# Patient Record
Sex: MALE | Race: WHITE | HISPANIC OR LATINO
[De-identification: names, ages, dates, MRNs, and addresses within clinical notes are randomized per-mention and may not be internally consistent; named-entity substitution may affect disease eponyms.]

---

## 2018-12-10 PROBLEM — Z00.00 ENCOUNTER FOR PREVENTIVE HEALTH EXAMINATION: Status: ACTIVE | Noted: 2018-12-10

## 2018-12-17 ENCOUNTER — APPOINTMENT (OUTPATIENT)
Dept: ORTHOPEDIC SURGERY | Facility: CLINIC | Age: 23
End: 2018-12-17
Payer: MEDICAID

## 2018-12-17 VITALS — SYSTOLIC BLOOD PRESSURE: 114 MMHG | DIASTOLIC BLOOD PRESSURE: 75 MMHG | HEART RATE: 97 BPM

## 2018-12-17 VITALS — BODY MASS INDEX: 26.66 KG/M2 | HEIGHT: 69 IN | WEIGHT: 180 LBS

## 2018-12-17 DIAGNOSIS — Z78.9 OTHER SPECIFIED HEALTH STATUS: ICD-10-CM

## 2018-12-17 DIAGNOSIS — Z80.0 FAMILY HISTORY OF MALIGNANT NEOPLASM OF DIGESTIVE ORGANS: ICD-10-CM

## 2018-12-17 DIAGNOSIS — M25.561 PAIN IN RIGHT KNEE: ICD-10-CM

## 2018-12-17 DIAGNOSIS — Z87.09 PERSONAL HISTORY OF OTHER DISEASES OF THE RESPIRATORY SYSTEM: ICD-10-CM

## 2018-12-17 PROCEDURE — 73564 X-RAY EXAM KNEE 4 OR MORE: CPT | Mod: RT

## 2018-12-17 PROCEDURE — 99204 OFFICE O/P NEW MOD 45 MIN: CPT

## 2020-07-29 ENCOUNTER — EMERGENCY (EMERGENCY)
Facility: HOSPITAL | Age: 25
LOS: 1 days | Discharge: HOME | End: 2020-07-29
Attending: EMERGENCY MEDICINE
Payer: COMMERCIAL

## 2020-07-29 VITALS
HEART RATE: 82 BPM | TEMPERATURE: 97 F | HEIGHT: 69 IN | SYSTOLIC BLOOD PRESSURE: 130 MMHG | WEIGHT: 171.96 LBS | RESPIRATION RATE: 19 BRPM | OXYGEN SATURATION: 99 % | DIASTOLIC BLOOD PRESSURE: 82 MMHG

## 2020-07-29 LAB
ALBUMIN SERPL ELPH-MCNC: 4.2 G/DL — SIGNIFICANT CHANGE UP (ref 3.5–5)
ALP SERPL-CCNC: 90 U/L — SIGNIFICANT CHANGE UP (ref 40–120)
ALT FLD-CCNC: 71 U/L DA — HIGH (ref 10–60)
ANION GAP SERPL CALC-SCNC: 10 MMOL/L — SIGNIFICANT CHANGE UP (ref 5–17)
APPEARANCE UR: CLEAR — SIGNIFICANT CHANGE UP
AST SERPL-CCNC: 20 U/L — SIGNIFICANT CHANGE UP (ref 10–40)
BASOPHILS # BLD AUTO: 0.04 K/UL — SIGNIFICANT CHANGE UP (ref 0–0.2)
BASOPHILS NFR BLD AUTO: 0.3 % — SIGNIFICANT CHANGE UP (ref 0–2)
BILIRUB SERPL-MCNC: 0.6 MG/DL — SIGNIFICANT CHANGE UP (ref 0.2–1.2)
BILIRUB UR-MCNC: NEGATIVE — SIGNIFICANT CHANGE UP
BUN SERPL-MCNC: 17 MG/DL — SIGNIFICANT CHANGE UP (ref 7–18)
CALCIUM SERPL-MCNC: 9.1 MG/DL — SIGNIFICANT CHANGE UP (ref 8.4–10.5)
CHLORIDE SERPL-SCNC: 104 MMOL/L — SIGNIFICANT CHANGE UP (ref 96–108)
CO2 SERPL-SCNC: 25 MMOL/L — SIGNIFICANT CHANGE UP (ref 22–31)
COLOR SPEC: YELLOW — SIGNIFICANT CHANGE UP
CREAT SERPL-MCNC: 1.33 MG/DL — HIGH (ref 0.5–1.3)
DIFF PNL FLD: NEGATIVE — SIGNIFICANT CHANGE UP
EOSINOPHIL # BLD AUTO: 0.05 K/UL — SIGNIFICANT CHANGE UP (ref 0–0.5)
EOSINOPHIL NFR BLD AUTO: 0.4 % — SIGNIFICANT CHANGE UP (ref 0–6)
GLUCOSE SERPL-MCNC: 124 MG/DL — HIGH (ref 70–99)
GLUCOSE UR QL: NEGATIVE — SIGNIFICANT CHANGE UP
HCT VFR BLD CALC: 45.8 % — SIGNIFICANT CHANGE UP (ref 39–50)
HGB BLD-MCNC: 16.1 G/DL — SIGNIFICANT CHANGE UP (ref 13–17)
IMM GRANULOCYTES NFR BLD AUTO: 0.4 % — SIGNIFICANT CHANGE UP (ref 0–1.5)
KETONES UR-MCNC: ABNORMAL
LEUKOCYTE ESTERASE UR-ACNC: NEGATIVE — SIGNIFICANT CHANGE UP
LIDOCAIN IGE QN: 84 U/L — SIGNIFICANT CHANGE UP (ref 73–393)
LYMPHOCYTES # BLD AUTO: 22.6 % — SIGNIFICANT CHANGE UP (ref 13–44)
LYMPHOCYTES # BLD AUTO: 3.16 K/UL — SIGNIFICANT CHANGE UP (ref 1–3.3)
MCHC RBC-ENTMCNC: 29.8 PG — SIGNIFICANT CHANGE UP (ref 27–34)
MCHC RBC-ENTMCNC: 35.2 GM/DL — SIGNIFICANT CHANGE UP (ref 32–36)
MCV RBC AUTO: 84.7 FL — SIGNIFICANT CHANGE UP (ref 80–100)
MONOCYTES # BLD AUTO: 0.8 K/UL — SIGNIFICANT CHANGE UP (ref 0–0.9)
MONOCYTES NFR BLD AUTO: 5.7 % — SIGNIFICANT CHANGE UP (ref 2–14)
NEUTROPHILS # BLD AUTO: 9.9 K/UL — HIGH (ref 1.8–7.4)
NEUTROPHILS NFR BLD AUTO: 70.6 % — SIGNIFICANT CHANGE UP (ref 43–77)
NITRITE UR-MCNC: NEGATIVE — SIGNIFICANT CHANGE UP
NRBC # BLD: 0 /100 WBCS — SIGNIFICANT CHANGE UP (ref 0–0)
PH UR: 7 — SIGNIFICANT CHANGE UP (ref 5–8)
PLATELET # BLD AUTO: 229 K/UL — SIGNIFICANT CHANGE UP (ref 150–400)
POTASSIUM SERPL-MCNC: 4.1 MMOL/L — SIGNIFICANT CHANGE UP (ref 3.5–5.3)
POTASSIUM SERPL-SCNC: 4.1 MMOL/L — SIGNIFICANT CHANGE UP (ref 3.5–5.3)
PROT SERPL-MCNC: 8 G/DL — SIGNIFICANT CHANGE UP (ref 6–8.3)
PROT UR-MCNC: NEGATIVE — SIGNIFICANT CHANGE UP
RBC # BLD: 5.41 M/UL — SIGNIFICANT CHANGE UP (ref 4.2–5.8)
RBC # FLD: 11.7 % — SIGNIFICANT CHANGE UP (ref 10.3–14.5)
SODIUM SERPL-SCNC: 139 MMOL/L — SIGNIFICANT CHANGE UP (ref 135–145)
SP GR SPEC: 1.01 — SIGNIFICANT CHANGE UP (ref 1.01–1.02)
UROBILINOGEN FLD QL: NEGATIVE — SIGNIFICANT CHANGE UP
WBC # BLD: 14 K/UL — HIGH (ref 3.8–10.5)
WBC # FLD AUTO: 14 K/UL — HIGH (ref 3.8–10.5)

## 2020-07-29 PROCEDURE — 36415 COLL VENOUS BLD VENIPUNCTURE: CPT

## 2020-07-29 PROCEDURE — 81003 URINALYSIS AUTO W/O SCOPE: CPT

## 2020-07-29 PROCEDURE — 85027 COMPLETE CBC AUTOMATED: CPT

## 2020-07-29 PROCEDURE — 83690 ASSAY OF LIPASE: CPT

## 2020-07-29 PROCEDURE — 99284 EMERGENCY DEPT VISIT MOD MDM: CPT | Mod: 25

## 2020-07-29 PROCEDURE — 74176 CT ABD & PELVIS W/O CONTRAST: CPT

## 2020-07-29 PROCEDURE — 96374 THER/PROPH/DIAG INJ IV PUSH: CPT

## 2020-07-29 PROCEDURE — 74176 CT ABD & PELVIS W/O CONTRAST: CPT | Mod: 26

## 2020-07-29 PROCEDURE — 96375 TX/PRO/DX INJ NEW DRUG ADDON: CPT

## 2020-07-29 PROCEDURE — 80053 COMPREHEN METABOLIC PANEL: CPT

## 2020-07-29 PROCEDURE — 99284 EMERGENCY DEPT VISIT MOD MDM: CPT

## 2020-07-29 RX ORDER — SODIUM CHLORIDE 9 MG/ML
1000 INJECTION INTRAMUSCULAR; INTRAVENOUS; SUBCUTANEOUS ONCE
Refills: 0 | Status: COMPLETED | OUTPATIENT
Start: 2020-07-29 | End: 2020-07-29

## 2020-07-29 RX ORDER — ONDANSETRON 8 MG/1
4 TABLET, FILM COATED ORAL ONCE
Refills: 0 | Status: COMPLETED | OUTPATIENT
Start: 2020-07-29 | End: 2020-07-29

## 2020-07-29 RX ORDER — KETOROLAC TROMETHAMINE 30 MG/ML
30 SYRINGE (ML) INJECTION ONCE
Refills: 0 | Status: DISCONTINUED | OUTPATIENT
Start: 2020-07-29 | End: 2020-07-29

## 2020-07-29 RX ORDER — FAMOTIDINE 10 MG/ML
20 INJECTION INTRAVENOUS ONCE
Refills: 0 | Status: COMPLETED | OUTPATIENT
Start: 2020-07-29 | End: 2020-07-29

## 2020-07-29 RX ADMIN — SODIUM CHLORIDE 1000 MILLILITER(S): 9 INJECTION INTRAMUSCULAR; INTRAVENOUS; SUBCUTANEOUS at 04:07

## 2020-07-29 RX ADMIN — Medication 30 MILLIGRAM(S): at 04:07

## 2020-07-29 RX ADMIN — ONDANSETRON 4 MILLIGRAM(S): 8 TABLET, FILM COATED ORAL at 04:07

## 2020-07-29 RX ADMIN — FAMOTIDINE 20 MILLIGRAM(S): 10 INJECTION INTRAVENOUS at 04:07

## 2020-07-29 NOTE — ED PROVIDER NOTE - CLINICAL SUMMARY MEDICAL DECISION MAKING FREE TEXT BOX
430a- CT reported no gallstones, no obstructive uropathy. Pt feels better. PO challenge in progress. 430a- CT reported no gallstones, no obstructive uropathy. Pt feels better. PO challenge in progress.  6am- Pt is well appearing, no guarding to repeat abdominal palpation, able to eat and drink without vomiting. Pt will f/u with GI-contact provided.   Pt is well appearing, has no new complaints and able to walk with normal gait. Pt is stable for discharge and follow up with medical doctor. Pt educated on care and need for follow up. Discussed anticipatory guidance and return precautions. Questions answered. I had a detailed discussion with the patient and/or guardian regarding the historical points, exam findings, and any diagnostic results supporting the discharge diagnosis.

## 2020-07-29 NOTE — ED PROVIDER NOTE - OBJECTIVE STATEMENT
Chief complaint of right flank and RUQ area tenderness starting at 1am associated with N/V. No fever.

## 2020-07-29 NOTE — ED PROVIDER NOTE - PHYSICAL EXAMINATION
No icterus, no jaundice. Right flank and right upper quadrant area tenderness, no rebound no rigidity.

## 2020-07-29 NOTE — ED ADULT TRIAGE NOTE - CHIEF COMPLAINT QUOTE
"I have abdominal pain started last Wednesday but I got worse around 2AM." Pt states he vomited 2-3 times for the past few days. Current nauseous. Denies fever and diarrhea. Pt is 8/10 bilateral lower quadrant radiating to the back. Denies recent travel or possible exposure to covid. "I have abdominal pain started last Wednesday but it got worse around 2AM." Pt states he vomited 2-3 times for the past few days. Current nauseous. Denies fever and diarrhea. Pt is 8/10 bilateral lower quadrant radiating to the back. Denies recent travel or possible exposure to covid.

## 2020-07-29 NOTE — ED PROVIDER NOTE - PATIENT PORTAL LINK FT
You can access the FollowMyHealth Patient Portal offered by [facility] by registering at the following website: [email or web address]. By joining [facility]’s FollowMyHealth portal, you will also be able to view your health information using other applications (apps) compatible with our system.

## 2020-07-29 NOTE — ED PROVIDER NOTE - NSFOLLOWUPCLINICS_GEN_ALL_ED_FT
[facility]  Gastroenterology  [address] [place] [address]  Phone: [phone]  Fax: [phone]  Follow Up Time:

## 2020-07-29 NOTE — ED ADULT NURSE NOTE - CHIEF COMPLAINT QUOTE
"I have abdominal pain started last Wednesday but it got worse around 2AM." Pt states he vomited 2-3 times for the past few days. Current nauseous. Denies fever and diarrhea. Pt is 8/10 bilateral lower quadrant radiating to the back. Denies recent travel or possible exposure to covid.

## 2020-07-29 NOTE — ED PROVIDER NOTE - NSFOLLOWUPINSTRUCTIONS_ED_ALL_ED_FT
Return to ER immediately if your abdominal pain does not improve, worsens, or persists, if you have fever, vomiting,  blood in stools or you have black stools, unable to eat or drink, have worsening/persistent diarrhea or constipation, any concerns. See your doctor as soon as possible (within 1-2 days).   If you need further assistance for appointments you can contact the [facility] Care Coordinator at [phone]. In addition our outpatient [facility] is located at [address] [place] [address], tele: [phone].

## 2020-07-29 NOTE — ED ADULT NURSE NOTE - OBJECTIVE STATEMENT
pt came in c/o right upper quadrant abdominal pain radiating to the back for 4 days, nausea and vomiting for yesterday and today

## 2020-08-05 ENCOUNTER — APPOINTMENT (OUTPATIENT)
Dept: GASTROENTEROLOGY | Facility: CLINIC | Age: 25
End: 2020-08-05
Payer: MEDICAID

## 2020-08-05 ENCOUNTER — LABORATORY RESULT (OUTPATIENT)
Age: 25
End: 2020-08-05

## 2020-08-05 VITALS
OXYGEN SATURATION: 97 % | TEMPERATURE: 98.1 F | HEIGHT: 69 IN | BODY MASS INDEX: 24.14 KG/M2 | SYSTOLIC BLOOD PRESSURE: 106 MMHG | DIASTOLIC BLOOD PRESSURE: 69 MMHG | WEIGHT: 163 LBS | HEART RATE: 84 BPM

## 2020-08-05 PROCEDURE — 99204 OFFICE O/P NEW MOD 45 MIN: CPT

## 2020-08-05 RX ORDER — DICYCLOMINE HYDROCHLORIDE 10 MG/1
10 CAPSULE ORAL 3 TIMES DAILY
Qty: 90 | Refills: 3 | Status: ACTIVE | COMMUNITY
Start: 2020-08-05 | End: 1900-01-01

## 2020-08-05 RX ORDER — PANTOPRAZOLE 40 MG/1
40 TABLET, DELAYED RELEASE ORAL
Qty: 30 | Refills: 3 | Status: ACTIVE | COMMUNITY
Start: 2020-08-05 | End: 1900-01-01

## 2020-08-05 NOTE — HISTORY OF PRESENT ILLNESS
[Yellow Skin Or Eyes (Jaundice)] : denies jaundice [ER Visit] : was seen in the Emergency Department [Rectal Pain] : denies rectal pain [Heartburn] : heartburn [Vomiting] : vomiting [Nausea] : nausea [Constipation] : constipation [Abdominal Pain] : abdominal pain [Diarrhea] : diarrhea [GERD] : gastroesophageal reflux disease [Abdominal Swelling] : abdominal swelling [Wt Gain ___ Lbs] : no recent weight gain [Wt Loss ___ Lbs] : no recent weight loss [Hiatus Hernia] : no hiatus hernia [Peptic Ulcer Disease] : no peptic ulcer disease [Pancreatitis] : no pancreatitis [Cholelithiasis] : no cholelithiasis [Kidney Stone] : no kidney stone [Inflammatory Bowel Disease] : no inflammatory bowel disease [Irritable Bowel Syndrome] : no irritable bowel syndrome [Diverticulitis] : no diverticulitis [Alcohol Abuse] : no alcohol abuse [Appendectomy] : no appendectomy [Malignancy] : no malignancy [Abdominal Surgery] : no abdominal surgery [de-identified] : The patient is a 24-year-old  male with past medical history significant for asthma who was referred to my office by Dr. Redd for abdominal pain, dyspepsia, gastroesophageal reflux disease and nausea/vomiting. The patient also admits to having alternating diarrhea/constipation, change in bowel habits and change in caliber of stool. I was asked to render an opinion for consultation for the above complaints.   The patient states that he is feeling uncomfortable x several weeks. The patient was recently [place] [facility] [place] for abdominal pain.   The patient had blood work and imaging studies to assess the symptoms.  The blood work performed on 2020 revealed an elevated glucose of 124 mg/dl, an elevated creatinine level of 1.3 mg/dl, an elevated ALT of 71 U/L and elevated WBC count of 14.00.  The urinalysis performed on 2020 was unremarkable.  The CAT scan of the abdomen and pelvis with IV contrast performed on 2020 revealed no hydroureteronephrosis or radiopaque urinary tract stones. No appendicitis no radiopaque gallstones. Bowel caliber change in the midline abdomen with mild upstream small bowel dilatation/fecalization and distal collapse which may be due to peristalsis with incomplete passage of the ingested contents.  Bowel obstruction is not entirely excluded. . The patient was observed with resolution of the symptoms and was discharged to followup in the office.   The patient complains of abdominal pain.  The patient describes the abdominal pain as a crampy, intermittent upper abdominal discomfort that occasionally radiates to the back.  The abdominal pain is worse with meals and improves with passing gas or having a bowel movement.  The abdominal pain is described as being severe in nature.  The abdominal pain occurs at night.  The abdominal pain can occur at any time.   The abdominal pain never has awakened the patient from sleep.  The abdominal pain is slightly relieved with certain medication such as pump inhibitors, H2 blockers and antacids. The abdominal pain is associated with abdominal gas and bloating.  The patient complains of nausea and vomiting.  The patient complains of occasional gastroesophageal reflux disease but denies any dysphagia. The gastroesophageal reflux disease is worse after meals and late at night. The gastroesophageal reflux disease is slightly improved with proton pump inhibitors, H2 blockers and antacids.  The patient denies any atypical chest pain, shortness of breath or palpitations.  The patient admits to occasional episodes of diaphoresis.  The patient complains of alternating diarrhea/constipation.  The patient has 1 bowel movement every other day.  The patient complains of a change in bowel habits.  The patient complains of a change in caliber of stool.   The diarrhea is described as soft in nature.  The patient denies having mucus discharge with the bowel movements.  The patient denies any bright red blood per rectum, melena or hematemesis.  The patient denies any rectal pain or rectal pruritus. The patient denies any weight loss or anorexia.  He complained of chills but denies any fevers.  The patient denies any jaundice or pruritus.  The patient denies any back pain.  The patient denies ever having a prior upper endoscopy and colonoscopy performed by another gastroenterologist.  The patient admits to a family history of GI problems.  The patient’s mother had a history of gallstones and father  of pancreatic cancer. [Cholecystectomy] : no cholecystectomy [de-identified] : (-) smoking, (-) ETOH, (-) IVDA\par

## 2020-08-05 NOTE — ASSESSMENT
[FreeTextEntry1] : Abdominal Pain: The patient complains of abdominal pain. The patient is to avoid nonsteroidal anti-inflammatory drugs and aspirin.  I recommend a low FOD-MAP diet.  I recommend a trial of Dicyclomine 10 mg tablet PO 3 times a day PRN for the abdominal pain.\par Dyspepsia: The patient complains of dyspeptic symptoms.  The patient was advised to abide by an anti-gas diet.  The patient was given a pamphlet for anti-gas.  The patient and I reviewed the anti-gas diet at length. The patient is to start on a trial of Phazyme one tablet 3 times a day p.r.n. abdominal pain and gas.\par GERD: The patient was advised to avoid late-night meals and dietary indiscretions.  The patient was advised to avoid fried and fatty foods.  The patient was advised to abide by an anti-GERD diet. The patient was given a pamphlet for anti-GERD.  The patient and I reviewed the anti-GERD diet at length. I recommend a trial of Pantoprazole 40 mg once a day x 3 months for the symptoms.\par Nausea/Vomiting: The patient complains of nausea/vomiting. If the symptoms of nausea/vomiting persists, the patient may require a trial of Zofran 4 mg twice a day.\par Alternating Diarrhea/Constipation: The patient complains of alternating diarrhea/constipation.  I recommend a low residue diet. The patient is to avoid fiber supplementation. The patient is to consider starting a trial of a probiotic such as Align once a day.   I recommend sending stool studies for C+S, O+P x3, and C. difficile to assess for an infectious etiology of the diarrhea.  The symptoms are worse after meals.  The patient has a history of cholecystectomy.  I recommend a trial of cholestyramine one packet once a day for possible bile induced diarrhea. If the symptoms persist, the patient may require a colonoscopy to assess for colitis versus other causes.  The patient was told of the risks and benefits of the procedure.  The patient was told of the risks of perforation, emergency surgery, bleeding, infections and missed lesions.  The patient agreed and will follow-up to reassess the symptoms.\par Abnormal Imaging Study: The CAT scan of the abdomen and pelvis with IV contrast performed on July 29, 2020 revealed no hydroureteronephrosis or radiopaque urinary tract stones. No appendicitis no radiopaque gallstones. Bowel caliber change in the midline abdomen with mild upstream small bowel dilatation/fecalization and distal collapse which may be due to peristalsis with incomplete passage of the ingested contents.  Bowel obstruction is not entirely excluded. \par The patient was previously evaluated at the [facility] emergency room for abdominal pain, nausea and vomiting.   The patient had blood work and imaging studies to assess the symptoms.  I reviewed the blood work and imaging studies performed in the emergency room.  \par Blood Work: I recommend blood work to assess the liver. I recommend a CBC, SMA 24, amylase, lipase, ESR, TFTs, KASIE, rheumatoid factor, celiac sprue panel, IgA, profile for hepatitis A, B, C. ,AFP, alpha 1 anti-trypsin  antibody, ceruloplasmin level, iron, TIBC, ferritin level, AMA, anti smooth muscle antibody and PT/INR/PTT.  I also recommend obtaining the recent blood work performed by the patient's PMD.\par Imaging Study: I recommend an imaging study to assess the symptoms. I recommend an abdominal ultrasound to assess the liver parenchyma and for biliary colic.\par Elevated Liver Enzymes: The patient has elevated liver enzymes noted on prior blood work. The patient denies any jaundice or pruritus.  The patient denies any alcohol use.  The patient denies taking large doses of nonsteroidal anti-inflammatory drugs or acetaminophen.  I had a long discussion with the patient regarding the elevated liver enzymes and the possible progression of the liver disease to cirrhosis.  The patient was told of the possible increased risk of developing liver failure, cirrhosis, ascites, GI bleeding secondary to varices, hepatic encephalopathy, bleeding tendencies and liver cancer.  The patient was told of the importance of follow-up.  The patient was advised to follow up every 6 months for blood work and imaging studies.  I recommend avoid alcohol and hepato-toxic agents.  I recommend avoiding large doses of nonsteroidal anti-inflammatory drugs or acetaminophen.  I recommend a CBC, SMA 24, amylase, lipase, ESR, TFTs, KASIE, rheumatoid factor, celiac sprue panel, IgA, profile for hepatitis A, B, C. ,AFP, alpha 1 anti-trypsin  antibody, ceruloplasmin level, iron, TIBC, ferritin level, AMA, anti smooth muscle antibody and PT/INR/PTT.  I recommend an abdominal ultrasound, CAT scan of the liver, MRI of the liver to assess the liver parenchyma and for liver lesions.   If the liver enzymes remain elevated, the patient may require a CT guided liver biopsy to assess the liver parenchyma and for possible treatment.  We had a long discussion regarding the risks and benefits of the procedure.  The patient was told of the risks of bleeding, perforation, infections, emergency surgery and missing lesions.  The patient agreed and will follow-up to reassess the symptoms.\par If the symptoms persist, the patient may require an upper endoscopy to assess for peptic ulcer disease versus esophagitis.  The patient was told of the risks and benefits of the procedure.  The patient was told of the risks of perforation, emergency surgery, bleeding, infections and missed lesions.  The patient agreed and will follow-up to reassess the symptoms.\par Follow-up: The patient is to follow-up in the office in 4 weeks to reassess the symptoms. The patient was told to call the office if any further problems. \par \par \par \par \par

## 2020-08-05 NOTE — REVIEW OF SYSTEMS
[Feeling Tired] : feeling tired [Constipation] : constipation [Abdominal Pain] : abdominal pain [Diarrhea] : diarrhea [Heartburn] : heartburn [Negative] : Endocrine

## 2020-08-06 LAB
A1AT SERPL-MCNC: 98 MG/DL
AFP-TM SERPL-MCNC: <1.8 NG/ML
ALBUMIN SERPL ELPH-MCNC: 5.2 G/DL
ALP BLD-CCNC: 84 U/L
ALT SERPL-CCNC: 56 U/L
AMYLASE/CREAT SERPL: 47 U/L
ANION GAP SERPL CALC-SCNC: 17 MMOL/L
APTT BLD: 35.9 SEC
AST SERPL-CCNC: 22 U/L
BASOPHILS # BLD AUTO: 0.03 K/UL
BASOPHILS NFR BLD AUTO: 0.4 %
BILIRUB SERPL-MCNC: 0.6 MG/DL
BUN SERPL-MCNC: 14 MG/DL
CALCIUM SERPL-MCNC: 9.9 MG/DL
CHLORIDE SERPL-SCNC: 105 MMOL/L
CHOLEST SERPL-MCNC: 177 MG/DL
CHOLEST/HDLC SERPL: 4.6 RATIO
CO2 SERPL-SCNC: 19 MMOL/L
CREAT SERPL-MCNC: 1.09 MG/DL
EOSINOPHIL # BLD AUTO: 0.1 K/UL
EOSINOPHIL NFR BLD AUTO: 1.4 %
ERYTHROCYTE [SEDIMENTATION RATE] IN BLOOD BY WESTERGREN METHOD: 7 MM/HR
FERRITIN SERPL-MCNC: 343 NG/ML
GGT SERPL-CCNC: 27 U/L
GLIADIN IGA SER QL: <5 UNITS
GLIADIN IGG SER QL: <5 UNITS
GLIADIN PEPTIDE IGA SER-ACNC: NEGATIVE
GLIADIN PEPTIDE IGG SER-ACNC: NEGATIVE
GLUCOSE SERPL-MCNC: 83 MG/DL
HBV CORE IGG+IGM SER QL: NONREACTIVE
HBV CORE IGM SER QL: NONREACTIVE
HBV SURFACE AB SER QL: REACTIVE
HBV SURFACE AG SER QL: NONREACTIVE
HCT VFR BLD CALC: 48.8 %
HCV AB SER QL: NONREACTIVE
HCV S/CO RATIO: 0.24 S/CO
HDLC SERPL-MCNC: 38 MG/DL
HEMOCCULT STL QL: NEGATIVE
HEPATITIS A IGG ANTIBODY: REACTIVE
HGB BLD-MCNC: 15.7 G/DL
IGA SER QL IEP: 150 MG/DL
IMM GRANULOCYTES NFR BLD AUTO: 0.1 %
INR PPP: 1.1 RATIO
IRON SATN MFR SERPL: 26 %
IRON SERPL-MCNC: 84 UG/DL
LDLC SERPL CALC-MCNC: 93 MG/DL
LPL SERPL-CCNC: 24 U/L
LYMPHOCYTES # BLD AUTO: 2.7 K/UL
LYMPHOCYTES NFR BLD AUTO: 38.4 %
MAN DIFF?: NORMAL
MCHC RBC-ENTMCNC: 29.6 PG
MCHC RBC-ENTMCNC: 32.2 GM/DL
MCV RBC AUTO: 92.1 FL
MITOCHONDRIA AB SER IF-ACNC: NORMAL
MONOCYTES # BLD AUTO: 0.47 K/UL
MONOCYTES NFR BLD AUTO: 6.7 %
NEUTROPHILS # BLD AUTO: 3.72 K/UL
NEUTROPHILS NFR BLD AUTO: 53 %
PLATELET # BLD AUTO: 224 K/UL
POTASSIUM SERPL-SCNC: 4.3 MMOL/L
PROT SERPL-MCNC: 7.4 G/DL
PT BLD: 13 SEC
RBC # BLD: 5.3 M/UL
RBC # FLD: 12.3 %
RHEUMATOID FACT SER QL: <10 IU/ML
SMOOTH MUSCLE AB SER QL IF: NORMAL
SODIUM SERPL-SCNC: 140 MMOL/L
TIBC SERPL-MCNC: 321 UG/DL
TRIGL SERPL-MCNC: 224 MG/DL
TSH SERPL-ACNC: 0.87 UIU/ML
TTG IGA SER IA-ACNC: <1.2 U/ML
TTG IGA SER-ACNC: NEGATIVE
TTG IGG SER IA-ACNC: 1.4 U/ML
TTG IGG SER IA-ACNC: NEGATIVE
UIBC SERPL-MCNC: 236 UG/DL
WBC # FLD AUTO: 7.03 K/UL

## 2020-08-07 LAB
ANA SER IF-ACNC: NEGATIVE
HBV E AB SER QL: NEGATIVE
HBV E AG SER QL: NEGATIVE

## 2020-08-19 ENCOUNTER — APPOINTMENT (OUTPATIENT)
Dept: GASTROENTEROLOGY | Facility: CLINIC | Age: 25
End: 2020-08-19
Payer: COMMERCIAL

## 2020-08-19 VITALS
WEIGHT: 163 LBS | OXYGEN SATURATION: 97 % | HEIGHT: 69 IN | TEMPERATURE: 98 F | DIASTOLIC BLOOD PRESSURE: 66 MMHG | SYSTOLIC BLOOD PRESSURE: 123 MMHG | BODY MASS INDEX: 24.14 KG/M2 | HEART RATE: 76 BPM

## 2020-08-19 DIAGNOSIS — R10.12 RIGHT UPPER QUADRANT PAIN: ICD-10-CM

## 2020-08-19 DIAGNOSIS — R11.2 NAUSEA WITH VOMITING, UNSPECIFIED: ICD-10-CM

## 2020-08-19 DIAGNOSIS — R10.11 RIGHT UPPER QUADRANT PAIN: ICD-10-CM

## 2020-08-19 PROCEDURE — 99214 OFFICE O/P EST MOD 30 MIN: CPT

## 2020-08-19 NOTE — HISTORY OF PRESENT ILLNESS
[None] : had no significant interval events [Diarrhea] : denies diarrhea [Rectal Pain] : denies rectal pain [Constipation] : denies constipation [Yellow Skin Or Eyes (Jaundice)] : denies jaundice [Heartburn] : heartburn [Wt Loss ___ Lbs] : recent [unfilled] ~Upound(s) weight loss [Nausea] : nausea [Abdominal Pain] : abdominal pain [Vomiting] : vomiting [Abdominal Swelling] : abdominal swelling [GERD] : gastroesophageal reflux disease [Cholelithiasis] : cholelithiasis [Wt Gain ___ Lbs] : no recent weight gain [Hiatus Hernia] : no hiatus hernia [Pancreatitis] : no pancreatitis [Peptic Ulcer Disease] : no peptic ulcer disease [Inflammatory Bowel Disease] : no inflammatory bowel disease [Kidney Stone] : no kidney stone [Irritable Bowel Syndrome] : no irritable bowel syndrome [Malignancy] : no malignancy [Alcohol Abuse] : no alcohol abuse [Diverticulitis] : no diverticulitis [Abdominal Surgery] : no abdominal surgery [Cholecystectomy] : no cholecystectomy [Appendectomy] : no appendectomy [de-identified] : The patient states that he is feeling better. The patient denies any jaundice or pruritus.  The patient denies any chronic lower back pain. The patient previously complained of abdominal pain.  The patient describes the abdominal pain as a crampy, constant right upper quadrant discomfort that radiates to the back.  The abdominal pain is unrelated to passing gas or having bowel movements.  The abdominal pain is worse with meals.  The abdominal pain is described as being mild moderate severe in nature.  The abdominal pain occurs at night and in the morning.  The abdominal pain can occur at any time.   The abdominal pain has awakened the patient from sleep.  The abdominal pain is not relieved with medication.  The abdominal pain is associated with abdominal gas and bloating.  .The patient previously complained of nausea and vomiting.  He currently denies any nausea or vomiting.  The patient denies any gastroesophageal reflux disease or dysphagia.  The patient denies any atypical chest pain, shortness of breath or palpitations.  The patient denies any diaphoresis. The patient denies any constipation or diarrhea.  The patient has 1 bowel movement every other day.  The patient denies a change in bowel habits.  The patient denies a change in caliber of stool.  The patient denies having mucus discharge with the bowel movements.  The patient denies any bright red blood per rectum, melena or hematemesis.  The patient denies any rectal pain or rectal pruritus.  The patient complains of weight loss but denies any anorexia.  The patient admits to losing 2 to 3 pounds over the past 1 to 2 months.  He denies any fevers or chills.  The patient had an abdominal ultrasound performed on 2020 to assess the symptoms, elevated liver enzymes and fatty liver to assess for biliary colic. The abdominal ultrasound revealed mild hepatosplenomegaly with diffuse fatty infiltration of the liver.  Also noted was cholelithiasis without evidence for cholecystitis. There was limited visualization of the pancreas due to bowel gas.  The patient had blood tests performed to assess the symptoms. The blood tests performed on 2020 revealed a low CO2 of 19 mmol/L, an elevated albumin of 5.2 g/dl, an elevated ALT of 56 U/L, an elevated PTT of 35.9 seconds, an elevated triglyceride level of 224 mg/dl, a reactive hepatitis A IgG antibody and an a reactive hepatitis B surface antibody. The stool guaiac was negative for occult blood.  The patient was recently evaluated at the [facility] emergency room on 2020 for abdominal pain. The patient had blood work and imaging studies to assess the symptoms. The blood work performed on 2020 revealed an elevated glucose of 124 mg/dl, an elevated creatinine level of 1.3 mg/dl, an elevated ALT of 71 U/L and elevated WBC count of 14.00. The urinalysis performed on 2020 was unremarkable. The CAT scan of the abdomen and pelvis with IV contrast performed on 2020 revealed no hydroureteronephrosis or radiopaque urinary tract stones. No appendicitis no radiopaque gallstones. Bowel caliber change in the midline abdomen with mild upstream small bowel dilatation/fecalization and distal collapse which may be due to peristalsis with incomplete passage of the ingested contents. Bowel obstruction is not entirely excluded. The patient was observed with resolution of the symptoms and was discharged to followup in the officeThe patient admits to a family history of GI problems. The patient’s mother had a history of gallstones and father  of pancreatic cancer.  [de-identified] : (-) smoking, (-) ETOH, (-) IVDA\par

## 2020-08-19 NOTE — ASSESSMENT
[FreeTextEntry1] : Abdominal Pain: The patient complains of abdominal pain. The patient is to avoid nonsteroidal anti-inflammatory drugs and aspirin.  I recommend a low FOD-MAP diet.  I recommend a trial of Dicyclomine 10 mg tablet PO 3 times a day PRN for the abdominal pain.\par Dyspepsia: The patient complains of dyspeptic symptoms.  The patient was advised to abide by an anti-gas diet.  The patient was given a pamphlet for anti-gas.  The patient and I reviewed the anti-gas diet at length. The patient is to start on a trial of Phazyme one tablet 3 times a day p.r.n. abdominal pain and gas.\par Nausea/Vomiting: The patient complains of nausea/vomiting. If the symptoms of nausea/vomiting persists, the patient may require a trial of Zofran 4 mg twice a day.\par GERD: The patient was advised to avoid late-night meals and dietary indiscretions.  The patient was advised to avoid fried and fatty foods.  The patient was advised to abide by an anti-GERD diet. The patient was given a pamphlet for anti-GERD.  The patient and I reviewed the anti-GERD diet at length. I recommend continue on a trial of Pantoprazole 40 mg once a day x 3 months for the symptoms.\par Abnormal Imaging Study: The CAT scan of the abdomen and pelvis with IV contrast performed on July 29, 2020 revealed no hydroureteronephrosis or radiopaque urinary tract stones. No appendicitis no radiopaque gallstones. Bowel caliber change in the midline abdomen with mild upstream small bowel dilatation/fecalization and distal collapse which may be due to peristalsis with incomplete passage of the ingested contents. Bowel obstruction is not entirely excluded. \par The patient was previously evaluated at the [facility] emergency room for abdominal pain, nausea and vomiting. The patient had blood work and imaging studies to assess the symptoms. I reviewed the blood work and imaging studies performed in the emergency room. \par Fatty Liver:  The patient had an imaging study suggestive of fatty infiltration of the liver.  The patient denies any jaundice or pruritus.  The patient denies any alcohol use.  The patient denies taking large doses of nonsteroidal anti-inflammatory drugs or acetaminophen.  The findings are suggestive of fatty liver.  The patient and I had a long discussion regarding the risks of fatty liver and possible progression to cirrhosis.  The patient was told of the possible increased risk of developing liver failure, cirrhosis, ascites, GI bleeding secondary to varices, hepatic encephalopathy, bleeding tendencies and liver cancer.  The patient was told of the importance of follow-up.  The patient was advised to follow up every 6 months for blood work and imaging studies. The patient agreed and will follow up. The patient was advised to lose weight. I recommend a trial of vitamin E supplementation for the fatty liver.  If the liver enzymes remain elevated, the patient may require a trial of Pioglitazone for the fatty liver. I recommend avoid alcohol and hepato-toxic agents.  The patient was also advised to avoid NSAIDs, Acetaminophen and any other hepatotoxic drugs. The patient was also advised not to share needles, razors, scissors, nail clippers, etc.. The patient is to continue close follow-up in our office for blood work and exams.  If the liver enzymes remain elevated, the patient may require a CT guided liver biopsy to assess the liver parenchyma for possible treatment.  We had a long discussion regarding the risks and benefits of the procedure.  The patient was told of the risks of bleeding, perforation, infections, emergency surgery and missing lesions.  The patient agreed and will follow-up to reassess the symptoms.  \par Cholelithiasis: The recent abdominal ultrasound performed revealed evidence of cholelithiasis with thickened gallbladder wall.  I recommend a surgical evaluation for possible biliary colic.  The patient was advised to follow up with Dr. Baltazar Hdez of surgery at [facility] for possible laparoscopic cholecystectomy.  The patient and I had a long discussion regarding the findings of the abdominal ultrasound and the need for surgical evaluation.  The patient and I also discussed the possible complications of gallstone disease that include biliary colic, gallbladder perforation, choledocholithiasis, cholangitis, gallstone ileus, gallstone pancreatitis, et cetera. The patient is aware and agrees to follow-up for the surgical evaluation and possible surgery.  The patient was advised to go to the emergency room if fever, chills and worsening abdominal pain develops.\par Elevated Liver Enzymes: The patient has elevated liver enzymes noted on prior blood work. The patient denies any jaundice or pruritus. The patient denies any alcohol use. The patient denies taking large doses of nonsteroidal anti-inflammatory drugs or acetaminophen. I had a long discussion with the patient regarding the elevated liver enzymes and the possible progression of the liver disease to cirrhosis. The patient was told of the possible increased risk of developing liver failure, cirrhosis, ascites, GI bleeding secondary to varices, hepatic encephalopathy, bleeding tendencies and liver cancer. The patient was told of the importance of follow-up. The patient was advised to follow up every 6 months for blood work and imaging studies. I recommend avoid alcohol and hepato-toxic agents. I recommend avoiding large doses of nonsteroidal anti-inflammatory drugs or acetaminophen.   I recommend repeat liver enzymes to reassess the elevated liver enzymes.  If the liver enzymes remain elevated, consider MRCP to assess for choledocholithiasis.\par If the symptoms persist, the patient may require an upper endoscopy to assess for peptic ulcer disease versus esophagitis. The patient was told of the risks and benefits of the procedure. The patient was told of the risks of perforation, emergency surgery, bleeding, infections and missed lesions. The patient agreed and will follow-up to reassess the symptoms.\par Follow-up: The patient is to follow-up in the office in 4 weeks to reassess the symptoms. The patient was told to call the office if any further problems. \par \par \par

## 2020-08-20 LAB
ALBUMIN SERPL ELPH-MCNC: 5 G/DL
ALP BLD-CCNC: 85 U/L
ALT SERPL-CCNC: 60 U/L
AMYLASE/CREAT SERPL: 52 U/L
ANION GAP SERPL CALC-SCNC: 12 MMOL/L
AST SERPL-CCNC: 24 U/L
BASOPHILS # BLD AUTO: 0.05 K/UL
BASOPHILS NFR BLD AUTO: 0.8 %
BILIRUB SERPL-MCNC: 0.9 MG/DL
BUN SERPL-MCNC: 13 MG/DL
CALCIUM SERPL-MCNC: 10 MG/DL
CHLORIDE SERPL-SCNC: 104 MMOL/L
CO2 SERPL-SCNC: 26 MMOL/L
CREAT SERPL-MCNC: 1.3 MG/DL
EOSINOPHIL # BLD AUTO: 0.08 K/UL
EOSINOPHIL NFR BLD AUTO: 1.3 %
GGT SERPL-CCNC: 23 U/L
GLUCOSE SERPL-MCNC: 78 MG/DL
HCT VFR BLD CALC: 49.4 %
HGB BLD-MCNC: 15.3 G/DL
IMM GRANULOCYTES NFR BLD AUTO: 0.2 %
LPL SERPL-CCNC: 28 U/L
LYMPHOCYTES # BLD AUTO: 2.25 K/UL
LYMPHOCYTES NFR BLD AUTO: 37.5 %
MAN DIFF?: NORMAL
MCHC RBC-ENTMCNC: 29.3 PG
MCHC RBC-ENTMCNC: 31 GM/DL
MCV RBC AUTO: 94.6 FL
MONOCYTES # BLD AUTO: 0.49 K/UL
MONOCYTES NFR BLD AUTO: 8.2 %
NEUTROPHILS # BLD AUTO: 3.12 K/UL
NEUTROPHILS NFR BLD AUTO: 52 %
PLATELET # BLD AUTO: 211 K/UL
POTASSIUM SERPL-SCNC: 4.7 MMOL/L
PROT SERPL-MCNC: 7.1 G/DL
RBC # BLD: 5.22 M/UL
RBC # FLD: 12.4 %
SODIUM SERPL-SCNC: 141 MMOL/L
WBC # FLD AUTO: 6 K/UL

## 2020-08-26 ENCOUNTER — APPOINTMENT (OUTPATIENT)
Dept: GASTROENTEROLOGY | Facility: CLINIC | Age: 25
End: 2020-08-26
Payer: COMMERCIAL

## 2020-08-26 VITALS
OXYGEN SATURATION: 98 % | BODY MASS INDEX: 23.64 KG/M2 | HEART RATE: 75 BPM | WEIGHT: 156 LBS | HEIGHT: 68 IN | TEMPERATURE: 97.5 F | SYSTOLIC BLOOD PRESSURE: 101 MMHG | DIASTOLIC BLOOD PRESSURE: 67 MMHG

## 2020-08-26 DIAGNOSIS — R93.89 ABNORMAL FINDINGS ON DIAGNOSTIC IMAGING OF OTHER SPECIFIED BODY STRUCTURES: ICD-10-CM

## 2020-08-26 DIAGNOSIS — R79.89 OTHER SPECIFIED ABNORMAL FINDINGS OF BLOOD CHEMISTRY: ICD-10-CM

## 2020-08-26 DIAGNOSIS — R10.11 RIGHT UPPER QUADRANT PAIN: ICD-10-CM

## 2020-08-26 PROCEDURE — 99214 OFFICE O/P EST MOD 30 MIN: CPT

## 2020-08-26 NOTE — HISTORY OF PRESENT ILLNESS
[None] : had no significant interval events [Heartburn] : denies heartburn [Vomiting] : resolved vomiting [Nausea] : resolved nausea [Constipation] : denies constipation [Diarrhea] : denies diarrhea [Yellow Skin Or Eyes (Jaundice)] : denies jaundice [Wt Loss ___ Lbs] : recent [unfilled] ~Upound(s) weight loss [Rectal Pain] : denies rectal pain [Abdominal Swelling] : abdominal swelling [Abdominal Pain] : abdominal pain [Cholelithiasis] : cholelithiasis [Wt Gain ___ Lbs] : no recent weight gain [GERD] : no gastroesophageal reflux disease [Hiatus Hernia] : no hiatus hernia [Peptic Ulcer Disease] : no peptic ulcer disease [Pancreatitis] : no pancreatitis [Inflammatory Bowel Disease] : no inflammatory bowel disease [Kidney Stone] : no kidney stone [Irritable Bowel Syndrome] : no irritable bowel syndrome [Diverticulitis] : no diverticulitis [Alcohol Abuse] : no alcohol abuse [Malignancy] : no malignancy [Abdominal Surgery] : no abdominal surgery [Appendectomy] : no appendectomy [Cholecystectomy] : no cholecystectomy [de-identified] : (-) smoking, (-) ETOH, (-) IVDA\par  [de-identified] : The patient states that he is feeling better. The patient denies any jaundice or pruritus.  The patient denies any chronic lower back pain. The patient complains of abdominal pain.  The patient describes the abdominal pain as a crampy, intermittent right upper quadrant discomfort that is nonradiating in nature.  The abdominal pain is unrelated to passing gas or having bowel movements.  The abdominal pain is worse with meals.  The abdominal pain is described as being mild to severe in nature.  The abdominal pain occurs at night.  The abdominal pain can occur at any time.   The abdominal pain has awakened the patient from sleep.  The abdominal pain is not relieved with medication.  The patient had 4 episodes of abdominal pain.  The abdominal pain is associated with abdominal gas and bloating. The patient complained of nausea and vomiting during the episodes.  He currently denies any nausea or vomiting.  The patient denies any gastroesophageal reflux disease or dysphagia.  The patient denies any atypical chest pain, shortness of breath or palpitations.  The patient denies any diaphoresis. The patient denies any constipation or diarrhea.  The patient has 1 bowel movement every 1 to 2 days.  The patient denies a change in bowel habits.  The patient denies a change in caliber of stool.  The patient denies having mucus discharge with the bowel movements.  The patient denies any bright red blood per rectum, melena or hematemesis.  The patient denies any rectal pain or rectal pruritus.  The patient complains of weight loss but denies any anorexia.  The patient admits to losing 6 pounds over the past 3 weeks.  The attributed the weight loss to his change in diet.  He denies any fevers or chills.  The patient had an abdominal ultrasound performed on 2020 to assess the symptoms, elevated liver enzymes and fatty liver to assess for biliary colic. The abdominal ultrasound revealed mild hepatosplenomegaly with diffuse fatty infiltration of the liver. Also noted was cholelithiasis without evidence for cholecystitis. There was limited visualization of the pancreas due to bowel gas. The blood work performed on 2020 revealed an elevated ALT of 60 U/L. The blood tests performed on 2020 revealed a low CO2 of 19 mmol/L, an elevated albumin of 5.2 g/dl, an elevated ALT of 56 U/L, an elevated PTT of 35.9 seconds, an elevated triglyceride level of 224 mg/dl, a reactive hepatitis A IgG antibody and an a reactive hepatitis B surface antibody. The stool guaiac was negative for occult blood. The patient was recently evaluated at the [facility] emergency room on 2020 for abdominal pain. The patient had blood work and imaging studies to assess the symptoms. The blood work performed on 2020 revealed an elevated glucose of 124 mg/dl, an elevated creatinine level of 1.3 mg/dl, an elevated ALT of 71 U/L and elevated WBC count of 14.00. The urinalysis performed on 2020 was unremarkable. The CAT scan of the abdomen and pelvis with IV contrast performed on 2020 revealed no hydroureteronephrosis or radiopaque urinary tract stones. No appendicitis no radiopaque gallstones. Bowel caliber change in the midline abdomen with mild upstream small bowel dilatation/fecalization and distal collapse which may be due to peristalsis with incomplete passage of the ingested contents. Bowel obstruction is not entirely excluded. The patient was observed with resolution of the symptoms and was discharged to followup in the office.  The patient admits to a family history of GI problems. The patient’s mother had a history of gallstones and father  of pancreatic cancer.

## 2020-08-26 NOTE — ASSESSMENT
[FreeTextEntry1] : Abdominal Pain: The patient complains of abdominal pain. The patient is to avoid nonsteroidal anti-inflammatory drugs and aspirin.  I recommend a low FOD-MAP diet.  I recommend a trial of Dicyclomine 10 mg tablet PO 3 times a day PRN for the abdominal pain.\par Dyspepsia: The patient complains of dyspeptic symptoms.  The patient was advised to abide by an anti-gas diet.  The patient was given a pamphlet for anti-gas.  The patient and I reviewed the anti-gas diet at length. The patient is to start on a trial of Phazyme one tablet 3 times a day p.r.n. abdominal pain and gas.\par Abnormal Imaging Study: The CAT scan of the abdomen and pelvis with IV contrast performed on July 29, 2020 revealed no hydroureteronephrosis or radiopaque urinary tract stones. No appendicitis no radiopaque gallstones. Bowel caliber change in the midline abdomen with mild upstream small bowel dilatation/fecalization and distal collapse which may be due to peristalsis with incomplete passage of the ingested contents. Bowel obstruction is not entirely excluded.  The blood work performed on August 19, 2020 revealed an elevated ALT of 60 U/L. I recommend an MRCP of the biliary tree to assess for choledocholithiasis.\par Prior ER Evaluation: The patient was previously evaluated at the [facility] [place] emergency room for abdominal pain, nausea and vomiting. The patient had blood work and imaging studies to assess the symptoms. I reviewed the blood work and imaging studies performed in the emergency room. \par Fatty Liver: The patient had an imaging study suggestive of fatty infiltration of the liver. The patient denies any jaundice or pruritus. The patient denies any alcohol use. The patient denies taking large doses of nonsteroidal anti-inflammatory drugs or acetaminophen. The findings are suggestive of fatty liver. The patient and I had a long discussion regarding the risks of fatty liver and possible progression to cirrhosis. The patient was told of the possible increased risk of developing liver failure, cirrhosis, ascites, GI bleeding secondary to varices, hepatic encephalopathy, bleeding tendencies and liver cancer. The patient was told of the importance of follow-up. The patient was advised to follow up every 6 months for blood work and imaging studies. The patient agreed and will follow up. The patient was advised to lose weight. I recommend a trial of vitamin E supplementation for the fatty liver. If the liver enzymes remain elevated, the patient may require a trial of Pioglitazone for the fatty liver. I recommend avoid alcohol and hepato-toxic agents. The patient was also advised to avoid NSAIDs, Acetaminophen and any other hepatotoxic drugs. The patient was also advised not to share needles, razors, scissors, nail clippers, etc.. The patient is to continue close follow-up in our office for blood work and exams. If the liver enzymes remain elevated, the patient may require a CT guided liver biopsy to assess the liver parenchyma for possible treatment. We had a long discussion regarding the risks and benefits of the procedure. The patient was told of the risks of bleeding, perforation, infections, emergency surgery and missing lesions. The patient agreed and will follow-up to reassess the symptoms. \par Cholelithiasis: The recent abdominal ultrasound performed revealed evidence of cholelithiasis with thickened gallbladder wall. I recommend a surgical evaluation for possible biliary colic. The patient was advised to follow up with Dr. Baltazar Hdez of surgery at [facility] [place] for possible laparoscopic cholecystectomy. The patient and I had a long discussion regarding the findings of the abdominal ultrasound and the need for surgical evaluation. The patient and I also discussed the possible complications of gallstone disease that include biliary colic, gallbladder perforation, choledocholithiasis, cholangitis, gallstone ileus, gallstone pancreatitis, et cetera. The patient is aware and agrees to follow-up for the surgical evaluation and possible surgery. The patient was advised to go to the emergency room if fever, chills and worsening abdominal pain develops.\par Elevated Liver Enzymes: The patient has elevated liver enzymes noted on prior blood work. The patient denies any jaundice or pruritus. The patient denies any alcohol use. The patient denies taking large doses of nonsteroidal anti-inflammatory drugs or acetaminophen. I had a long discussion with the patient regarding the elevated liver enzymes and the possible progression of the liver disease to cirrhosis. The patient was told of the possible increased risk of developing liver failure, cirrhosis, ascites, GI bleeding secondary to varices, hepatic encephalopathy, bleeding tendencies and liver cancer. The patient was told of the importance of follow-up. The patient was advised to follow up every 6 months for blood work and imaging studies. I recommend avoid alcohol and hepato-toxic agents. I recommend avoiding large doses of nonsteroidal anti-inflammatory drugs or acetaminophen. I recommend repeat liver enzymes to reassess the elevated liver enzymes. If the liver enzymes remain elevated, consider MRCP to assess for choledocholithiasis.\par If the symptoms persist, the patient may require an upper endoscopy to assess for peptic ulcer disease versus esophagitis. The patient was told of the risks and benefits of the procedure. The patient was told of the risks of perforation, emergency surgery, bleeding, infections and missed lesions. The patient agreed and will follow-up to reassess the symptoms.\par Follow-up: The patient is to follow-up in the office in 4 weeks to reassess the symptoms. The patient was told to call the office if any further problems. \par

## 2020-08-28 ENCOUNTER — OUTPATIENT (OUTPATIENT)
Dept: OUTPATIENT SERVICES | Facility: HOSPITAL | Age: 25
LOS: 1 days | End: 2020-08-28
Payer: COMMERCIAL

## 2020-08-28 ENCOUNTER — APPOINTMENT (OUTPATIENT)
Dept: MRI IMAGING | Facility: HOSPITAL | Age: 25
End: 2020-08-28
Payer: COMMERCIAL

## 2020-08-28 DIAGNOSIS — K76.0 FATTY (CHANGE OF) LIVER, NOT ELSEWHERE CLASSIFIED: ICD-10-CM

## 2020-08-28 DIAGNOSIS — K80.20 CALCULUS OF GALLBLADDER WITHOUT CHOLECYSTITIS WITHOUT OBSTRUCTION: ICD-10-CM

## 2020-08-28 DIAGNOSIS — R79.89 OTHER SPECIFIED ABNORMAL FINDINGS OF BLOOD CHEMISTRY: ICD-10-CM

## 2020-08-28 PROCEDURE — 74183 MRI ABD W/O CNTR FLWD CNTR: CPT

## 2020-08-28 PROCEDURE — 74183 MRI ABD W/O CNTR FLWD CNTR: CPT | Mod: 26

## 2020-09-14 ENCOUNTER — APPOINTMENT (OUTPATIENT)
Dept: SURGERY | Facility: CLINIC | Age: 25
End: 2020-09-14
Payer: COMMERCIAL

## 2020-09-14 VITALS
TEMPERATURE: 97.2 F | DIASTOLIC BLOOD PRESSURE: 72 MMHG | HEIGHT: 68 IN | BODY MASS INDEX: 23.49 KG/M2 | SYSTOLIC BLOOD PRESSURE: 103 MMHG | HEART RATE: 80 BPM | WEIGHT: 155 LBS

## 2020-09-14 DIAGNOSIS — Z80.0 FAMILY HISTORY OF MALIGNANT NEOPLASM OF DIGESTIVE ORGANS: ICD-10-CM

## 2020-09-14 DIAGNOSIS — J45.909 UNSPECIFIED ASTHMA, UNCOMPLICATED: ICD-10-CM

## 2020-09-14 DIAGNOSIS — Z56.0 UNEMPLOYMENT, UNSPECIFIED: ICD-10-CM

## 2020-09-14 DIAGNOSIS — K80.20 CALCULUS OF GALLBLADDER W/OUT CHOLECYSTITIS W/OUT OBSTRUCTION: ICD-10-CM

## 2020-09-14 PROCEDURE — 99203 OFFICE O/P NEW LOW 30 MIN: CPT

## 2020-09-14 SDOH — ECONOMIC STABILITY - INCOME SECURITY: UNEMPLOYMENT, UNSPECIFIED: Z56.0

## 2020-09-14 NOTE — CONSULT LETTER
[Dear  ___] : Dear  [unfilled], [Consult Letter:] : I had the pleasure of evaluating your patient, [unfilled]. [Please see my note below.] : Please see my note below. [Consult Closing:] : Thank you very much for allowing me to participate in the care of this patient.  If you have any questions, please do not hesitate to contact me. [Sincerely,] : Sincerely, [FreeTextEntry3] : Baltazar Hdez MD, FACS

## 2020-09-14 NOTE — DATA REVIEWED
[FreeTextEntry1] : EXAM:  MR MRCP [facility] IC \par PROCEDURE DATE:  08/28/2020 \par INTERPRETATION:  Abdominal MR without and with IV contrast including MRCP\par \par Indication: Elevated liver enzymes. Cholelithiasis.\par \par Technique: Utilizing a 1.5 Shaina high-field magnet, multiplanar multisequence MR images of the abdomen are acquired without and with IV contrast ( 7.5cc Gadavist administered, 2.5 cc discarded), supplemented by thin and thick slab MRCP images. Postcontrast images are acquired dynamically.\par \par Comparison: No prior abdominal MR is available for comparison..\par \par Findings: Hepatic steatosis.\par \par Small gallstone in the gallbladder. No evidence for thickened gallbladder wall, or pericholecystic fluid. No evidence for choledocholithiasis. The common bile duct is not dilated. The main pancreatic duct maintains normal caliber without dilatation.\par \par The pancreas, spleen, adrenals and right kidney appear unremarkable. Tiny fatty lesion in the left kidney, compatible with an angiomyolipoma.\par \par No ascites, or enlarged lymph node. The visualized bowel structures appear grossly unremarkable.\par \par Impression: Hepatic steatosis.\par \par Cholelithiasis.\par \par No evidence for choledocholithiasis. \par

## 2020-09-14 NOTE — HISTORY OF PRESENT ILLNESS
[de-identified] : Mr. PHU CANALES is a 24 year  old patient who was referred by Dr. Leonid Rizo with the chief complaint of having right upper quadrant and epigastric pain for 1 month.  Pain is non-radiating .   He reports no nausea or vomiting and no history of jaundice, acholia or choluria.   Appetite is good and weight is stable.   He   has  family history of biliary tract disease  in his mother.  he had 3 recent episodes of acute abdominal pain . The pain has gotten better because of diet adjustments.  He had an abdominal MRI on  08/28/2020 which revealed Small gallstone in the gallbladder. CBD is normal\par

## 2020-09-14 NOTE — PHYSICAL EXAM
[Abdominal Masses] : No abdominal masses [Abdomen Tenderness] : ~T ~M No abdominal tenderness [Alert] : alert [Oriented to Place] : oriented to place [Oriented to Person] : oriented to person [Oriented to Time] : oriented to time [de-identified] : He  is alert, well-groomed, and cheerful.\par   [Calm] : calm [de-identified] : anicteric.  Nasal mucosa pink, septum midline. Oral mucosa pink.  Tongue midline, Pharynx without exudates.\par   [de-identified] : Neck supple. Trachea midline. Thyroid isthmus barely palpable, lobes not felt.\par

## 2020-09-14 NOTE — PLAN
[FreeTextEntry1] : Mr. CANALES  was told significance of findings, options, risks and benefits were explained.  Informed consent for laparoscopic/possible open  cholecystectomy 52381 and potential risks, benefits and alternatives (surgical options were discussed including non-surgical options or the option of no surgery) to the planned surgery were discussed in depth.  All surgical options were discussed including non-surgical treatments.  He wishes to proceed with surgery.  We will plan for surgery on at the next available date, pending any required insurance pre-certification or pre-approval. He agrees to obtain any necessary pre-operative evaluations and testing prior to surgery.\par Patient advised to seek immediate medical attention with any acute change in symptoms or with the development of any new or worsening symptoms.  Patient's questions and concerns addressed to patient's satisfaction, and patient verbalized an understanding of the information discussed.\par \par

## 2020-10-05 ENCOUNTER — OUTPATIENT (OUTPATIENT)
Dept: OUTPATIENT SERVICES | Facility: HOSPITAL | Age: 25
LOS: 1 days | End: 2020-10-05
Payer: COMMERCIAL

## 2020-10-05 VITALS
HEIGHT: 68 IN | DIASTOLIC BLOOD PRESSURE: 63 MMHG | TEMPERATURE: 99 F | OXYGEN SATURATION: 99 % | HEART RATE: 80 BPM | RESPIRATION RATE: 16 BRPM | WEIGHT: 145.95 LBS | SYSTOLIC BLOOD PRESSURE: 107 MMHG

## 2020-10-05 DIAGNOSIS — K81.9 CHOLECYSTITIS, UNSPECIFIED: ICD-10-CM

## 2020-10-05 DIAGNOSIS — J45.909 UNSPECIFIED ASTHMA, UNCOMPLICATED: ICD-10-CM

## 2020-10-05 DIAGNOSIS — Z01.818 ENCOUNTER FOR OTHER PREPROCEDURAL EXAMINATION: ICD-10-CM

## 2020-10-05 DIAGNOSIS — Q66.01 CONGENITAL TALIPES EQUINOVARUS, RIGHT FOOT: Chronic | ICD-10-CM

## 2020-10-05 DIAGNOSIS — Q66.89 OTHER SPECIFIED CONGENITAL DEFORMITIES OF FEET: Chronic | ICD-10-CM

## 2020-10-05 LAB — BLD GP AB SCN SERPL QL: SIGNIFICANT CHANGE UP

## 2020-10-05 PROCEDURE — G0463: CPT

## 2020-10-05 NOTE — H&P PST ADULT - REASON FOR ADMISSION
I had pain in my right upper abdomen, radiating to my right flank, nausea, vomiting at the end July. I came to this hospital.  An MRI CAT scan were done

## 2020-10-05 NOTE — H&P PST ADULT - NEGATIVE ALLERGY TYPES
no reactions to animals/no reactions to food/no reactions to insect bites/no outdoor environmental allergies/no indoor environmental allergies/no reactions to medicines

## 2020-10-05 NOTE — H&P PST ADULT - ASSESSMENT
24 yo male is scheduled for : Laparoscopic Cholecystectomy with Intra-Operative Cholangiogram  Possible Open, on 10/12/20

## 2020-10-05 NOTE — H&P PST ADULT - HISTORY OF PRESENT ILLNESS
26 yo male with history of Asthma (no hospitalization, no intubation), reports the above. Patient states had pain x 3 in July, and realized that the pain was associated with the food that he has been eating. He has changed his diet and feels better now.   He is scheduled for : Laparoscopic Cholecystectomy with Intra-Operative Cholangiogram     Possible Open, on 10/12/20

## 2020-10-05 NOTE — H&P PST ADULT - NSICDXPROBLEM_GEN_ALL_CORE_FT
PROBLEM DIAGNOSES  Problem: Cholecystitis, unspecified  Assessment and Plan: Laparoscopic Cholecystectomy with Intra-Operative Cholangiogram  Possible Open      Problem: Asthma  Assessment and Plan: Continue using the inhalers as prescribed

## 2020-10-05 NOTE — H&P PST ADULT - NSANTHOSAYNRD_GEN_A_CORE
No. FORD screening performed.  STOP BANG Legend: 0-2 = LOW Risk; 3-4 = INTERMEDIATE Risk; 5-8 = HIGH Risk

## 2020-10-06 PROBLEM — J45.909 UNSPECIFIED ASTHMA, UNCOMPLICATED: Chronic | Status: ACTIVE | Noted: 2020-10-05

## 2020-10-07 DIAGNOSIS — Z01.818 ENCOUNTER FOR OTHER PREPROCEDURAL EXAMINATION: ICD-10-CM

## 2020-10-09 ENCOUNTER — APPOINTMENT (OUTPATIENT)
Dept: DISASTER EMERGENCY | Facility: CLINIC | Age: 25
End: 2020-10-09

## 2020-10-10 LAB — SARS-COV-2 N GENE NPH QL NAA+PROBE: NOT DETECTED

## 2020-10-11 ENCOUNTER — TRANSCRIPTION ENCOUNTER (OUTPATIENT)
Age: 25
End: 2020-10-11

## 2020-10-12 ENCOUNTER — INPATIENT (INPATIENT)
Facility: HOSPITAL | Age: 25
LOS: 1 days | Discharge: HOME | DRG: 419 | End: 2020-10-14
Attending: SPECIALIST | Admitting: SPECIALIST
Payer: COMMERCIAL

## 2020-10-12 ENCOUNTER — APPOINTMENT (OUTPATIENT)
Dept: SURGERY | Facility: HOSPITAL | Age: 25
End: 2020-10-12

## 2020-10-12 ENCOUNTER — RESULT REVIEW (OUTPATIENT)
Age: 25
End: 2020-10-12

## 2020-10-12 VITALS
DIASTOLIC BLOOD PRESSURE: 62 MMHG | HEIGHT: 68 IN | RESPIRATION RATE: 16 BRPM | TEMPERATURE: 99 F | HEART RATE: 91 BPM | SYSTOLIC BLOOD PRESSURE: 101 MMHG | WEIGHT: 145.95 LBS | OXYGEN SATURATION: 100 %

## 2020-10-12 DIAGNOSIS — Q66.01 CONGENITAL TALIPES EQUINOVARUS, RIGHT FOOT: Chronic | ICD-10-CM

## 2020-10-12 DIAGNOSIS — K81.9 CHOLECYSTITIS, UNSPECIFIED: ICD-10-CM

## 2020-10-12 DIAGNOSIS — K80.50 CALCULUS OF BILE DUCT WITHOUT CHOLANGITIS OR CHOLECYSTITIS WITHOUT OBSTRUCTION: ICD-10-CM

## 2020-10-12 LAB — BLD GP AB SCN SERPL QL: SIGNIFICANT CHANGE UP

## 2020-10-12 PROCEDURE — 99221 1ST HOSP IP/OBS SF/LOW 40: CPT

## 2020-10-12 PROCEDURE — 47563 LAPARO CHOLECYSTECTOMY/GRAPH: CPT

## 2020-10-12 PROCEDURE — 88304 TISSUE EXAM BY PATHOLOGIST: CPT | Mod: 26

## 2020-10-12 PROCEDURE — 47563 LAPARO CHOLECYSTECTOMY/GRAPH: CPT | Mod: AS

## 2020-10-12 RX ORDER — SODIUM CHLORIDE 9 MG/ML
3 INJECTION INTRAMUSCULAR; INTRAVENOUS; SUBCUTANEOUS EVERY 8 HOURS
Refills: 0 | Status: DISCONTINUED | OUTPATIENT
Start: 2020-10-12 | End: 2020-10-12

## 2020-10-12 RX ORDER — GABAPENTIN 400 MG/1
1 CAPSULE ORAL
Qty: 6 | Refills: 0
Start: 2020-10-12 | End: 2020-10-13

## 2020-10-12 RX ORDER — BUDESONIDE AND FORMOTEROL FUMARATE DIHYDRATE 160; 4.5 UG/1; UG/1
2 AEROSOL RESPIRATORY (INHALATION)
Qty: 0 | Refills: 0 | DISCHARGE

## 2020-10-12 RX ORDER — ALBUTEROL 90 UG/1
2 AEROSOL, METERED ORAL
Qty: 0 | Refills: 0 | DISCHARGE

## 2020-10-12 RX ORDER — DEXTROSE MONOHYDRATE, SODIUM CHLORIDE, AND POTASSIUM CHLORIDE 50; .745; 4.5 G/1000ML; G/1000ML; G/1000ML
1000 INJECTION, SOLUTION INTRAVENOUS
Refills: 0 | Status: DISCONTINUED | OUTPATIENT
Start: 2020-10-12 | End: 2020-10-14

## 2020-10-12 RX ORDER — HYDROMORPHONE HYDROCHLORIDE 2 MG/ML
1 INJECTION INTRAMUSCULAR; INTRAVENOUS; SUBCUTANEOUS
Refills: 0 | Status: DISCONTINUED | OUTPATIENT
Start: 2020-10-12 | End: 2020-10-12

## 2020-10-12 RX ORDER — BUDESONIDE AND FORMOTEROL FUMARATE DIHYDRATE 160; 4.5 UG/1; UG/1
2 AEROSOL RESPIRATORY (INHALATION)
Refills: 0 | Status: DISCONTINUED | OUTPATIENT
Start: 2020-10-12 | End: 2020-10-14

## 2020-10-12 RX ORDER — ACETAMINOPHEN WITH CODEINE 300MG-30MG
1 TABLET ORAL EVERY 4 HOURS
Refills: 0 | Status: DISCONTINUED | OUTPATIENT
Start: 2020-10-12 | End: 2020-10-14

## 2020-10-12 RX ORDER — HYDROMORPHONE HYDROCHLORIDE 2 MG/ML
0.5 INJECTION INTRAMUSCULAR; INTRAVENOUS; SUBCUTANEOUS
Refills: 0 | Status: DISCONTINUED | OUTPATIENT
Start: 2020-10-12 | End: 2020-10-12

## 2020-10-12 RX ADMIN — HYDROMORPHONE HYDROCHLORIDE 0.5 MILLIGRAM(S): 2 INJECTION INTRAMUSCULAR; INTRAVENOUS; SUBCUTANEOUS at 09:42

## 2020-10-12 RX ADMIN — Medication 1 TABLET(S): at 15:00

## 2020-10-12 RX ADMIN — SODIUM CHLORIDE 3 MILLILITER(S): 9 INJECTION INTRAMUSCULAR; INTRAVENOUS; SUBCUTANEOUS at 07:07

## 2020-10-12 RX ADMIN — Medication 1 TABLET(S): at 21:08

## 2020-10-12 RX ADMIN — HYDROMORPHONE HYDROCHLORIDE 0.5 MILLIGRAM(S): 2 INJECTION INTRAMUSCULAR; INTRAVENOUS; SUBCUTANEOUS at 09:32

## 2020-10-12 RX ADMIN — DEXTROSE MONOHYDRATE, SODIUM CHLORIDE, AND POTASSIUM CHLORIDE 75 MILLILITER(S): 50; .745; 4.5 INJECTION, SOLUTION INTRAVENOUS at 14:21

## 2020-10-12 RX ADMIN — HYDROMORPHONE HYDROCHLORIDE 0.5 MILLIGRAM(S): 2 INJECTION INTRAMUSCULAR; INTRAVENOUS; SUBCUTANEOUS at 09:55

## 2020-10-12 RX ADMIN — BUDESONIDE AND FORMOTEROL FUMARATE DIHYDRATE 2 PUFF(S): 160; 4.5 AEROSOL RESPIRATORY (INHALATION) at 22:15

## 2020-10-12 RX ADMIN — Medication 1 TABLET(S): at 22:00

## 2020-10-12 RX ADMIN — Medication 1 TABLET(S): at 14:21

## 2020-10-12 RX ADMIN — HYDROMORPHONE HYDROCHLORIDE 0.5 MILLIGRAM(S): 2 INJECTION INTRAMUSCULAR; INTRAVENOUS; SUBCUTANEOUS at 11:01

## 2020-10-12 NOTE — CONSULT NOTE ADULT - ASSESSMENT
25 M with PMHx of Asthma (no hx of hospitalization or intubation) p/w pain in right upper abdomen radiating to back associated with nausea and vomiting. S/P Lap Dottie today with IOC showing possible stone in duct.

## 2020-10-12 NOTE — BRIEF OPERATIVE NOTE - NSICDXBRIEFPOSTOP_GEN_ALL_CORE_FT
POST-OP DIAGNOSIS:  Choledocholithiasis 12-Oct-2020 08:55:43  Irvin Adame  Cholecystitis 12-Oct-2020 08:55:34  Irvin Adame

## 2020-10-12 NOTE — PROGRESS NOTE ADULT - SUBJECTIVE AND OBJECTIVE BOX
Pt s- complaints.   ICU Vital Signs Last 24 Hrs  T(C): 36.8 (12 Oct 2020 12:45), Max: 37.1 (12 Oct 2020 09:02)  T(F): 98.3 (12 Oct 2020 12:45), Max: 98.8 (12 Oct 2020 09:02)  HR: 81 (12 Oct 2020 12:45) (69 - 94)  BP: 110/71 (12 Oct 2020 12:45) (101/62 - 136/78)  BP(mean): 75 (12 Oct 2020 10:25) (72 - 90)  ABP: --  ABP(mean): --  RR: 16 (12 Oct 2020 12:45) (12 - 18)  SpO2: 100% (12 Oct 2020 12:45) (95% - 100%)  Alert nad  Abd: soft nt nd  wounds: cdi

## 2020-10-12 NOTE — PACU DISCHARGE NOTE - AIRWAY PATENCY:
Telephone Encounter by Julia Morgan NCMA at 11/05/18 12:07 PM     Author:  Julia Morgan NCMA Service:  (none) Author Type:  Medical Assistant     Filed:  11/05/18 12:08 PM Encounter Date:  11/5/2018 Status:  Signed     :  Julia Morgan NCMA (Medical Assistant)            To RN to triage and discuss pain[MG1.1M]       Revision History        User Key Date/Time User Provider Type Action    > MG1.1 11/05/18 12:08 PM Julia Morgan NCMA Medical Assistant Sign    M - Manual             Satisfactory

## 2020-10-12 NOTE — ASU DISCHARGE PLAN (ADULT/PEDIATRIC) - CARE PROVIDER_API CALL
Baltazar Hdez  SURGERY  [address] [place] [address]  Phone: [phone]  Fax: [phone]  Follow Up Time:     Manjit Dove)  Gastroenterology  [address] [place] [address]  Phone: [phone]  Fax: [phone]  Follow Up Time:

## 2020-10-12 NOTE — CONSULT NOTE ADULT - SUBJECTIVE AND OBJECTIVE BOX
[facility] GI CONSULTATION    Patient is a 25y old  Male who presents with a chief complaint of abdominal pain      PMH/PSH:  PAST MEDICAL & SURGICAL HISTORY:  Asthma    Bilateral club feet      FH:  FAMILY HISTORY:      MEDS:  MEDICATIONS  (STANDING):  budesonide  80 MICROgram(s)/formoterol 4.5 MICROgram(s) Inhaler 2 Puff(s) Inhalation two times a day  sodium chloride 0.9% with potassium chloride 20 mEq/L 1000 milliLiter(s) (75 mL/Hr) IV Continuous <Continuous>    MEDICATIONS  (PRN):  acetaminophen 300 mG/codeine 30 mG 1 Tablet(s) Oral every 4 hours PRN Moderate Pain (4 - 6)    Allergies    No Known Allergies    Intolerances            CONSTITUTIONAL:  No weight loss, fever, chills, weakness or fatigue.  HEENT:  Eyes:  No visual loss, blurred vision, double vision or yellow sclerae. Ears, Nose, Throat:  No hearing loss, sneezing, congestion, runny nose or sore throat.  SKIN:  No rash or itching.  CARDIOVASCULAR:  No chest pain, chest pressure or chest discomfort. No palpitations or edema.  RESPIRATORY:  No shortness of breath, cough or sputum.  GASTROINTESTINAL:  SEE HPI  GENITOURINARY:  No dysuria, hematuria, urinary frequency  NEUROLOGICAL:  No headache, dizziness, syncope, paralysis, ataxia, numbness or tingling in the extremities. No change in bowel or bladder control.  MUSCULOSKELETAL:  No muscle, back pain, joint pain or stiffness.  HEMATOLOGIC:  No anemia, bleeding or bruising.  LYMPHATICS:  No enlarged nodes. No history of splenectomy.  PSYCHIATRIC:  No history of depression or anxiety.  ENDOCRINOLOGIC:  No reports of sweating, cold or heat intolerance. No polyuria or polydipsia.      ______________________________________________________________________  PHYSICAL EXAM:  T(C): 36.7 (10-12-20 @ 10:50), Max: 37.1 (10-12-20 @ 09:02)  HR: 84 (10-12-20 @ 10:50)  BP: 120/68 (10-12-20 @ 10:50)  RR: 15 (10-12-20 @ 10:50)  SpO2: 96% (10-12-20 @ 10:50)  Wt(kg): --    10-12  -  10-12  --------------------------------------------------------  IN:    IV PiggyBack: 750 mL  Total IN: 750 mL    OUT:  Total OUT: 0 mL    Total NET: 750 mL          GEN: NAD, normocephalic  CVS: S1S2+  CHEST: clear to auscultation  ABD: soft , nontender, nondistended, bowel sounds present  EXTR: no cyanosis, no clubbing, no edema  NEURO: Awake and alert; oriented .....  SKIN:  warm;  non icteric    ______________________________________________________________________          [facility] GI CONSULTATION    Patient is a 25y old  Male who presents with a chief complaint of abdominal pain      PMH/PSH:  PAST MEDICAL & SURGICAL HISTORY:  Asthma    Bilateral club feet      FH:  FAMILY HISTORY:      MEDS:  MEDICATIONS  (STANDING):  budesonide  80 MICROgram(s)/formoterol 4.5 MICROgram(s) Inhaler 2 Puff(s) Inhalation two times a day  sodium chloride 0.9% with potassium chloride 20 mEq/L 1000 milliLiter(s) (75 mL/Hr) IV Continuous <Continuous>    MEDICATIONS  (PRN):  acetaminophen 300 mG/codeine 30 mG 1 Tablet(s) Oral every 4 hours PRN Moderate Pain (4 - 6)    Allergies    No Known Allergies    Intolerances            CONSTITUTIONAL:  No weight loss, fever, chills, weakness or fatigue.  HEENT:  Eyes:  No visual loss, blurred vision, double vision or yellow sclerae. Ears, Nose, Throat:  No hearing loss, sneezing, congestion, runny nose or sore throat.  SKIN:  No rash or itching.  CARDIOVASCULAR:  No chest pain, chest pressure or chest discomfort. No palpitations or edema.  RESPIRATORY:  No shortness of breath, cough or sputum.  GASTROINTESTINAL:  SEE HPI  GENITOURINARY:  No dysuria, hematuria, urinary frequency  NEUROLOGICAL:  No headache, dizziness, syncope, paralysis, ataxia, numbness or tingling in the extremities. No change in bowel or bladder control.  MUSCULOSKELETAL:  No muscle, back pain, joint pain or stiffness.  HEMATOLOGIC:  No anemia, bleeding or bruising.  LYMPHATICS:  No enlarged nodes. No history of splenectomy.  PSYCHIATRIC:  No history of depression or anxiety.  ENDOCRINOLOGIC:  No reports of sweating, cold or heat intolerance. No polyuria or polydipsia.      ______________________________________________________________________  PHYSICAL EXAM:  T(C): 36.7 (10-12-20 @ 10:50), Max: 37.1 (10-12-20 @ 09:02)  HR: 84 (10-12-20 @ 10:50)  BP: 120/68 (10-12-20 @ 10:50)  RR: 15 (10-12-20 @ 10:50)  SpO2: 96% (10-12-20 @ 10:50)  Wt(kg): --    10-12  -  10-12  --------------------------------------------------------  IN:    IV PiggyBack: 750 mL  Total IN: 750 mL    OUT:  Total OUT: 0 mL    Total NET: 750 mL          GEN: NAD, normocephalic  CVS: S1S2+  CHEST: clear to auscultation  ABD: soft , nontender, nondistended, bowel sounds present  EXTR: no cyanosis, no clubbing, no edema  NEURO: Awake and alert; oriented to person, place, time and situation   SKIN:  warm;  non icteric    ______________________________________________________________________

## 2020-10-12 NOTE — CONSULT NOTE ADULT - PROBLEM SELECTOR RECOMMENDATION 9
s/p lap ileana with IOC  plan for ERCP likely tomorrow s/p lap ileana with IOC  plan for ERCP tomorrow  NPO after MN s/p lap ileana with IOC  plan for ERCP tomorrow  clear liquid diet   NPO from 8a tomorrow

## 2020-10-13 ENCOUNTER — TRANSCRIPTION ENCOUNTER (OUTPATIENT)
Age: 25
End: 2020-10-13

## 2020-10-13 LAB
ALBUMIN SERPL ELPH-MCNC: 3.5 G/DL — SIGNIFICANT CHANGE UP (ref 3.5–5)
ALP SERPL-CCNC: 79 U/L — SIGNIFICANT CHANGE UP (ref 40–120)
ALT FLD-CCNC: 46 U/L DA — SIGNIFICANT CHANGE UP (ref 10–60)
ANION GAP SERPL CALC-SCNC: 5 MMOL/L — SIGNIFICANT CHANGE UP (ref 5–17)
AST SERPL-CCNC: 17 U/L — SIGNIFICANT CHANGE UP (ref 10–40)
BILIRUB SERPL-MCNC: 0.9 MG/DL — SIGNIFICANT CHANGE UP (ref 0.2–1.2)
BUN SERPL-MCNC: 8 MG/DL — SIGNIFICANT CHANGE UP (ref 7–18)
CALCIUM SERPL-MCNC: 9.2 MG/DL — SIGNIFICANT CHANGE UP (ref 8.4–10.5)
CHLORIDE SERPL-SCNC: 110 MMOL/L — HIGH (ref 96–108)
CO2 SERPL-SCNC: 25 MMOL/L — SIGNIFICANT CHANGE UP (ref 22–31)
CREAT SERPL-MCNC: 1.01 MG/DL — SIGNIFICANT CHANGE UP (ref 0.5–1.3)
GLUCOSE SERPL-MCNC: 89 MG/DL — SIGNIFICANT CHANGE UP (ref 70–99)
HCT VFR BLD CALC: 40.8 % — SIGNIFICANT CHANGE UP (ref 39–50)
HGB BLD-MCNC: 13.9 G/DL — SIGNIFICANT CHANGE UP (ref 13–17)
MCHC RBC-ENTMCNC: 29.3 PG — SIGNIFICANT CHANGE UP (ref 27–34)
MCHC RBC-ENTMCNC: 34.1 GM/DL — SIGNIFICANT CHANGE UP (ref 32–36)
MCV RBC AUTO: 86.1 FL — SIGNIFICANT CHANGE UP (ref 80–100)
NRBC # BLD: 0 /100 WBCS — SIGNIFICANT CHANGE UP (ref 0–0)
PLATELET # BLD AUTO: 187 K/UL — SIGNIFICANT CHANGE UP (ref 150–400)
POTASSIUM SERPL-MCNC: 4.3 MMOL/L — SIGNIFICANT CHANGE UP (ref 3.5–5.3)
POTASSIUM SERPL-SCNC: 4.3 MMOL/L — SIGNIFICANT CHANGE UP (ref 3.5–5.3)
PROT SERPL-MCNC: 6.5 G/DL — SIGNIFICANT CHANGE UP (ref 6–8.3)
RBC # BLD: 4.74 M/UL — SIGNIFICANT CHANGE UP (ref 4.2–5.8)
RBC # FLD: 11.4 % — SIGNIFICANT CHANGE UP (ref 10.3–14.5)
SODIUM SERPL-SCNC: 140 MMOL/L — SIGNIFICANT CHANGE UP (ref 135–145)
WBC # BLD: 7.81 K/UL — SIGNIFICANT CHANGE UP (ref 3.8–10.5)
WBC # FLD AUTO: 7.81 K/UL — SIGNIFICANT CHANGE UP (ref 3.8–10.5)

## 2020-10-13 PROCEDURE — 43262 ENDO CHOLANGIOPANCREATOGRAPH: CPT

## 2020-10-13 RX ORDER — FENTANYL CITRATE 50 UG/ML
25 INJECTION INTRAVENOUS
Refills: 0 | Status: DISCONTINUED | OUTPATIENT
Start: 2020-10-13 | End: 2020-10-13

## 2020-10-13 RX ORDER — CIPROFLOXACIN LACTATE 400MG/40ML
400 VIAL (ML) INTRAVENOUS ONCE
Refills: 0 | Status: COMPLETED | OUTPATIENT
Start: 2020-10-13 | End: 2020-10-13

## 2020-10-13 RX ORDER — ACETAMINOPHEN 500 MG
1000 TABLET ORAL ONCE
Refills: 0 | Status: DISCONTINUED | OUTPATIENT
Start: 2020-10-13 | End: 2020-10-13

## 2020-10-13 RX ORDER — INDOMETHACIN 50 MG
50 CAPSULE ORAL ONCE
Refills: 0 | Status: COMPLETED | OUTPATIENT
Start: 2020-10-13 | End: 2020-10-13

## 2020-10-13 RX ORDER — ONDANSETRON 8 MG/1
4 TABLET, FILM COATED ORAL ONCE
Refills: 0 | Status: DISCONTINUED | OUTPATIENT
Start: 2020-10-13 | End: 2020-10-13

## 2020-10-13 RX ADMIN — Medication 200 MILLIGRAM(S): at 08:27

## 2020-10-13 RX ADMIN — Medication 1 TABLET(S): at 08:27

## 2020-10-13 RX ADMIN — Medication 50 MILLIGRAM(S): at 13:00

## 2020-10-13 RX ADMIN — Medication 1 TABLET(S): at 17:21

## 2020-10-13 RX ADMIN — Medication 1 TABLET(S): at 09:00

## 2020-10-13 RX ADMIN — Medication 50 MILLIGRAM(S): at 12:32

## 2020-10-13 RX ADMIN — Medication 1 TABLET(S): at 22:30

## 2020-10-13 RX ADMIN — Medication 1 TABLET(S): at 18:00

## 2020-10-13 RX ADMIN — BUDESONIDE AND FORMOTEROL FUMARATE DIHYDRATE 2 PUFF(S): 160; 4.5 AEROSOL RESPIRATORY (INHALATION) at 21:42

## 2020-10-13 NOTE — DISCHARGE NOTE PROVIDER - HOSPITAL COURSE
24 y/o male was admitted to the hospital for laparoscopic cholecystectomy. Patient underwent the procedure 10/12/20 with a positive intra-operative cholangiogram. Patient underwent an ERCP 10/13. Pt was stable for discharge with appropriate follow-up. 24 y/o male was admitted to the hospital for laparoscopic cholecystectomy. Patient underwent the procedure 10/12/20 with a positive intra-operative cholangiogram. Patient underwent an ERCP 10/13 no stone was identified. Pt was stable for discharge with appropriate follow-up.

## 2020-10-13 NOTE — PROGRESS NOTE ADULT - SUBJECTIVE AND OBJECTIVE BOX
26 y/o male s/p laparoscopic cholecystectomy with + IOC  POD # 1 Patient examined at bedside, no complaints.   No nausea, no vomiting  Currently NPO   for ERCP today     T(F): 98.5 (10-13-20 @ 06:51), Max: 98.7 (10-13-20 @ 00:11)  HR: 78 (10-13-20 @ 06:51) (75 - 94)  BP: 107/59 (10-13-20 @ 06:51) (106/51 - 120/68)  RR: 16 (10-13-20 @ 06:51) (12 - 16)  SpO2: 100% (10-13-20 @ 06:51) (96% - 100%)  Wt(kg): --      10-12 @ 07:01  -  10-13 @ 07:00  --------------------------------------------------------  IN:    IV PiggyBack: 750 mL  Total IN: 750 mL    OUT:  Total OUT: 0 mL    Total NET: 750 mL                              13.9   7.81  )-----------( 187      ( 13 Oct 2020 06:31 )             40.8   10-13    140  |  110<H>  |  8   ----------------------------<  89  4.3   |  25  |  1.01    Ca    9.2      13 Oct 2020 06:31    TPro  6.5  /  Alb  3.5  /  TBili  0.9  /  DBili  x   /  AST  17  /  ALT  46  /  AlkPhos  79  10-13        Physical Exam  General: AAOx3, No acute distress  Skin: No jaundice, no icterus  Abdomen: soft, mild zoey incisional tenderness,  nondistended, no rebound tenderness, no guarding, no palpable masses  : Normal external genitalia  Extremities: non edematous, no calf pain bilaterally

## 2020-10-13 NOTE — PROGRESS NOTE ADULT - SUBJECTIVE AND OBJECTIVE BOX
Subjective and Objective   Pt seen at bedside    Patient is a 25y old  Male POD#1 s/p laparoscopic cholecystectomy with IOC showing stones in the CBD.       Interval HPI/Overnight events:       Vital Signs Last 24 Hrs  T(C): 37.1 (13 Oct 2020 00:11), Max: 37.1 (12 Oct 2020 09:02)  T(F): 98.7 (13 Oct 2020 00:11), Max: 98.8 (12 Oct 2020 09:02)  HR: 89 (13 Oct 2020 00:11) (69 - 94)  BP: 110/60 (13 Oct 2020 00:11) (101/62 - 136/78)  BP(mean): 75 (12 Oct 2020 10:25) (72 - 90)  RR: 16 (13 Oct 2020 00:11) (12 - 18)  SpO2: 98% (13 Oct 2020 00:11) (95% - 100%)    Physical Exam:    Gen: AAOX3, mild distress,   HEENT:   Chest: RRR  Abd:  Pelvic:  Ext:       MEDICATIONS  (STANDING):  budesonide  80 MICROgram(s)/formoterol 4.5 MICROgram(s) Inhaler 2 Puff(s) Inhalation two times a day  sodium chloride 0.9% with potassium chloride 20 mEq/L 1000 milliLiter(s) (75 mL/Hr) IV Continuous <Continuous>    MEDICATIONS  (PRN):  acetaminophen 300 mG/codeine 30 mG 1 Tablet(s) Oral every 4 hours PRN Moderate Pain (4 - 6)      Labs:                I&O's Detail    12 Oct 2020 07:01  -  13 Oct 2020 06:37  --------------------------------------------------------  IN:    IV PiggyBack: 750 mL  Total IN: 750 mL    OUT:  Total OUT: 0 mL    Total NET: 750 mL          Radiology:   Subjective and Objective   Pt seen at bedside    Patient is a 25y old  Male with PMHx of asthma POD#1 s/p laparoscopic cholecystectomy with IOC showing stones in the CBD. Pt endorses moderate RUQ pain (6/10) with movement that is being controlled with pain medication. Pt tolerated clear liquids yesterday, has been ambulatory and voiding. Pt is negative for N/V and has been NPO since midnight.       Interval HPI/Overnight events: No overnight events as per nurse.       Vital Signs Last 24 Hrs  T(C): 37.1 (13 Oct 2020 00:11), Max: 37.1 (12 Oct 2020 09:02)  T(F): 98.7 (13 Oct 2020 00:11), Max: 98.8 (12 Oct 2020 09:02)  HR: 89 (13 Oct 2020 00:11) (69 - 94)  BP: 110/60 (13 Oct 2020 00:11) (101/62 - 136/78)  BP(mean): 75 (12 Oct 2020 10:25) (72 - 90)  RR: 16 (13 Oct 2020 00:11) (12 - 18)  SpO2: 98% (13 Oct 2020 00:11) (95% - 100%)    Physical Exam:    Gen: AAOX3, calm and cooperative, winces with abdominal movement   Chest: Lungs CTA b/l, RRR no M/R/G  Abd: soft, ND, moderate TTP in RUQ, wounds are c/d/i showing no signs of infection  Ext: wearing SCD, distal pulses present, no edema, pt has b/l clubfoot      MEDICATIONS  (STANDING):  budesonide  80 MICROgram(s)/formoterol 4.5 MICROgram(s) Inhaler 2 Puff(s) Inhalation two times a day  sodium chloride 0.9% with potassium chloride 20 mEq/L 1000 milliLiter(s) (75 mL/Hr) IV Continuous <Continuous>    MEDICATIONS  (PRN):  acetaminophen 300 mG/codeine 30 mG 1 Tablet(s) Oral every 4 hours PRN Moderate Pain (4 - 6)      Labs:                I&O's Detail    12 Oct 2020 07:01  -  13 Oct 2020 06:37  --------------------------------------------------------  IN:    IV PiggyBack: 750 mL  Total IN: 750 mL    OUT:  Total OUT: 0 mL    Total NET: 750 mL          Radiology:

## 2020-10-13 NOTE — DISCHARGE NOTE PROVIDER - NSDCCPCAREPLAN_GEN_ALL_CORE_FT
PRINCIPAL DISCHARGE DIAGNOSIS  Diagnosis: Choledocholithiasis  Assessment and Plan of Treatment: s/p cholecystectomy   Please follow up with Dr. Hdez within 1 week   Diet as tolerate d  No heavy lifting or straining

## 2020-10-13 NOTE — PROGRESS NOTE ADULT - SUBJECTIVE AND OBJECTIVE BOX
ENDOSCOPIC RETROGRADE CHOLANGIO-PANCREATOGRAPHY (ERCP    -Informed consent obtained from patient prior to exam.     INDICATION:  ? Choledocholithiasis     Anesthesia provided by: General    ESOPHAGUS: Normal    STOMACH: Normal    DUODENUM: Normal    PANCREATIC DUCT: Not injected  BILE DUCT: Small cbd ~ 2.5 mm  .Duct sweep x 2 . No stones seen . excellent drainage. Intrahepatics thin. Sphincterotomy done    Cystic DUCT remnant ok    The patient tolerated the procedure well.    Assessment: Thin looking bile ducts and intrahepatics. ? NORMAL VARIANT vs wondering if one day he will develop PSC     PLAN:   F/u MRCP in 1 year with me  Full liquids  Labs AM    Manjit Dove MD

## 2020-10-13 NOTE — DISCHARGE NOTE PROVIDER - CARE PROVIDER_API CALL
Baltazar Hdez  SURGERY  [address] [place] [address]  Phone: [phone]  Fax: [phone]  Follow Up Time: 1 week

## 2020-10-13 NOTE — DISCHARGE NOTE PROVIDER - NSDCCPTREATMENT_GEN_ALL_CORE_FT
PRINCIPAL PROCEDURE  Procedure: Laparoscopic cholecystectomy with cholangiography  Findings and Treatment:

## 2020-10-13 NOTE — DISCHARGE NOTE PROVIDER - NSDCMRMEDTOKEN_GEN_ALL_CORE_FT
gabapentin 100 mg oral capsule: 1 cap(s) orally 3 times a day, As Needed -for moderate pain MDD:3   Symbicort 80 mcg-4.5 mcg/inh inhalation aerosol: 2 puff(s) inhaled 2 times a day   acetaminophen 500 mg oral tablet: 2 tab(s) orally every 6 hours, As Needed MDD:8 tabs   Symbicort 80 mcg-4.5 mcg/inh inhalation aerosol: 2 puff(s) inhaled 2 times a day

## 2020-10-14 ENCOUNTER — TRANSCRIPTION ENCOUNTER (OUTPATIENT)
Age: 25
End: 2020-10-14

## 2020-10-14 VITALS
SYSTOLIC BLOOD PRESSURE: 105 MMHG | HEART RATE: 78 BPM | OXYGEN SATURATION: 99 % | TEMPERATURE: 99 F | DIASTOLIC BLOOD PRESSURE: 56 MMHG | RESPIRATION RATE: 16 BRPM

## 2020-10-14 DIAGNOSIS — Z98.890 OTHER SPECIFIED POSTPROCEDURAL STATES: ICD-10-CM

## 2020-10-14 PROCEDURE — C1769: CPT

## 2020-10-14 PROCEDURE — 88304 TISSUE EXAM BY PATHOLOGIST: CPT

## 2020-10-14 PROCEDURE — C1889: CPT

## 2020-10-14 PROCEDURE — 86901 BLOOD TYPING SEROLOGIC RH(D): CPT

## 2020-10-14 PROCEDURE — 86850 RBC ANTIBODY SCREEN: CPT

## 2020-10-14 PROCEDURE — 80053 COMPREHEN METABOLIC PANEL: CPT

## 2020-10-14 PROCEDURE — 85027 COMPLETE CBC AUTOMATED: CPT

## 2020-10-14 PROCEDURE — 86900 BLOOD TYPING SEROLOGIC ABO: CPT

## 2020-10-14 PROCEDURE — 99232 SBSQ HOSP IP/OBS MODERATE 35: CPT

## 2020-10-14 PROCEDURE — 94640 AIRWAY INHALATION TREATMENT: CPT

## 2020-10-14 PROCEDURE — 76000 FLUOROSCOPY <1 HR PHYS/QHP: CPT

## 2020-10-14 RX ORDER — ACETAMINOPHEN 500 MG
650 TABLET ORAL EVERY 6 HOURS
Refills: 0 | Status: DISCONTINUED | OUTPATIENT
Start: 2020-10-14 | End: 2020-10-14

## 2020-10-14 RX ORDER — ACETAMINOPHEN 500 MG
2 TABLET ORAL
Qty: 24 | Refills: 0
Start: 2020-10-14 | End: 2020-10-16

## 2020-10-14 RX ADMIN — Medication 650 MILLIGRAM(S): at 09:28

## 2020-10-14 RX ADMIN — Medication 650 MILLIGRAM(S): at 10:00

## 2020-10-14 RX ADMIN — BUDESONIDE AND FORMOTEROL FUMARATE DIHYDRATE 2 PUFF(S): 160; 4.5 AEROSOL RESPIRATORY (INHALATION) at 09:31

## 2020-10-14 NOTE — PROGRESS NOTE ADULT - SUBJECTIVE AND OBJECTIVE BOX
INTERVAL HPI/OVERNIGHT EVENTS:  Pt stable.   Tolerating diet.   flatus/BM.  No abdominal pain.  ERCP findings noted.    Vital Signs Last 24 Hrs  T(C): 37.2 (14 Oct 2020 05:47), Max: 37.2 (14 Oct 2020 05:47)  T(F): 98.9 (14 Oct 2020 05:47), Max: 98.9 (14 Oct 2020 05:47)  HR: 68 (14 Oct 2020 05:47) (64 - 89)  BP: 100/56 (14 Oct 2020 05:47) (100/56 - 120/76)  BP(mean): 77 (13 Oct 2020 16:22) (77 - 86)  RR: 17 (14 Oct 2020 05:47) (12 - 20)  SpO2: 98% (14 Oct 2020 05:47) (98% - 100%)    Physical:  Abdomen: Soft nondistended, nontender.  Port sites clean.    I&O's Summary    13 Oct 2020 07:01  -  14 Oct 2020 07:00  --------------------------------------------------------  IN: 0 mL / OUT: 1700 mL / NET: -1700 mL        LABS:                        13.9   7.81  )-----------( 187      ( 13 Oct 2020 06:31 )             40.8             10-13    140  |  110<H>  |  8   ----------------------------<  89  4.3   |  25  |  1.01    Ca    9.2      13 Oct 2020 06:31    TPro  6.5  /  Alb  3.5  /  TBili  0.9  /  DBili  x   /  AST  17  /  ALT  46  /  AlkPhos  79  10-13

## 2020-10-14 NOTE — DISCHARGE NOTE NURSING/CASE MANAGEMENT/SOCIAL WORK - PATIENT PORTAL LINK FT
You can access the FollowMyHealth Patient Portal offered by [facility] by registering at the following website: [email or web address]. By joining [facility]’s FollowMyHealth portal, you will also be able to view your health information using other applications (apps) compatible with our system.

## 2020-10-14 NOTE — DISCHARGE NOTE NURSING/CASE MANAGEMENT/SOCIAL WORK - NSDCVIVACCINE_GEN_ALL_CORE_FT
Patient cancelled appointment with PSR Andreas Christianson for Monday 06/29 @ 12pm with Dr. Akin Briscoe MD. Done  
Patient see's cardiologist on 7.28.20, she is wondering if she should wait to follow up with Dr. Briscoe until after that appointment or keep her Monday 6.29.20 appointment with Dr. Briscoe. Please call patient.     Thank you   Sonal   
No Vaccines Administered.

## 2020-10-14 NOTE — PROGRESS NOTE ADULT - SUBJECTIVE AND OBJECTIVE BOX
GI PROGRESS NOTE    Patient is a 25y old  Male who presents with a chief complaint of Cholecystitis (13 Oct 2020 15:08)      HPI:          ______________________________________________________________________  PMH/PSH:  PAST MEDICAL & SURGICAL HISTORY:  Asthma    Bilateral club feet      ______________________________________________________________________  MEDS:  MEDICATIONS  (STANDING):  budesonide  80 MICROgram(s)/formoterol 4.5 MICROgram(s) Inhaler 2 Puff(s) Inhalation two times a day  sodium chloride 0.9% with potassium chloride 20 mEq/L 1000 milliLiter(s) (75 mL/Hr) IV Continuous <Continuous>    MEDICATIONS  (PRN):  acetaminophen   Tablet .. 650 milliGRAM(s) Oral every 6 hours PRN Mild Pain (1 - 3)  acetaminophen 300 mG/codeine 30 mG 1 Tablet(s) Oral every 4 hours PRN Moderate Pain (4 - 6)    ______________________________________________________________________  ALL:   Allergies    No Known Allergies    Intolerances      ______________________________________________________________________  SH: Social History:    ______________________________________________________________________  FH:  FAMILY HISTORY:    ______________________________________________________________________  ROS:    CONSTITUTIONAL:  No weight loss, fever, chills, weakness or fatigue.    HEENT:  Eyes:  No visual loss, blurred vision, double vision or yellow sclerae. Ears, Nose, Throat:  No hearing loss, sneezing, congestion, runny nose or sore throat.    SKIN:  No rash or itching.    CARDIOVASCULAR:  No chest pain, chest pressure or chest discomfort. No palpitations or edema.    RESPIRATORY:  No shortness of breath, cough or sputum.    GASTROINTESTINAL:  SEE HPI    GENITOURINARY:  No dysuria, hematuria, urinary frequency    NEUROLOGICAL:  No headache, dizziness, syncope, paralysis, ataxia, numbness or tingling in the extremities. No change in bowel or bladder control.    MUSCULOSKELETAL:  No muscle, back pain, joint pain or stiffness.    HEMATOLOGIC:  No anemia, bleeding or bruising.    LYMPHATICS:  No enlarged nodes. No history of splenectomy.    PSYCHIATRIC:  No history of depression or anxiety.    ENDOCRINOLOGIC:  No reports of sweating, cold or heat intolerance. No polyuria or polydipsia.    ALLERGIES:  No history of asthma, hives, eczema or rhinitis.  ______________________________________________________________________  PHYSICAL EXAM:  T(C): 37.2 (10-14-20 @ 05:47), Max: 37.2 (10-14-20 @ 05:47)  HR: 68 (10-14-20 @ 05:47)  BP: 100/56 (10-14-20 @ 05:47)  RR: 17 (10-14-20 @ 05:47)  SpO2: 98% (10-14-20 @ 05:47)  Wt(kg): --    10-13  -  10-14  --------------------------------------------------------  IN:  Total IN: 0 mL    OUT:    Voided (mL): 1700 mL  Total OUT: 1700 mL    Total NET: -1700 mL          GEN: NAD   CVS- S1 S2  ABD: soft nontender, non distended, bowel sounds+ no rebound no guarding  LUNGS: clear to auscultation  NEURO: non focal neuro exam;   Extremities: no cyanosis, no calf tenderness, no edema, no clubbing      ______________________________________________________________________  LABS:                        13.9   7.81  )-----------( 187      ( 13 Oct 2020 06:31 )             40.8     10-13    140  |  110<H>  |  8   ----------------------------<  89  4.3   |  25  |  1.01    Ca    9.2      13 Oct 2020 06:31    TPro  6.5  /  Alb  3.5  /  TBili  0.9  /  DBili  x   /  AST  17  /  ALT  46  /  AlkPhos  79  10-13    LIVER FUNCTIONS - ( 13 Oct 2020 06:31 )  Alb: 3.5 g/dL / Pro: 6.5 g/dL / ALK PHOS: 79 U/L / ALT: 46 U/L DA / AST: 17 U/L / GGT: x           ______________________________________________________________________  IMAGING:    ______________________________________________________________________  ASSESSMENT:  25y Male    PLAN:

## 2020-10-14 NOTE — PROGRESS NOTE ADULT - PROBLEM SELECTOR PLAN 1
s/p ERCP 10/13 showing  thin looking bile ducts and intrahepatics. ? NORMAL VARIANT   F/u MRCP in 1 year with Dr. Dove at [address] [place] [address]

## 2020-10-14 NOTE — PROGRESS NOTE ADULT - SUBJECTIVE AND OBJECTIVE BOX
Subjective and Objective     24 y/o male is POD#2 s/p laparoscopic cholecystectomy with intra-operative cholangiogram, underwent ERCP with sphincterotomy 10/13/20. Pt examined at bedside      Interval HPI/Overnight events:       Vital Signs Last 24 Hrs  T(C): 37.2 (14 Oct 2020 05:47), Max: 37.2 (14 Oct 2020 05:47)  T(F): 98.9 (14 Oct 2020 05:47), Max: 98.9 (14 Oct 2020 05:47)  HR: 68 (14 Oct 2020 05:47) (64 - 89)  BP: 100/56 (14 Oct 2020 05:47) (100/56 - 120/76)  BP(mean): 77 (13 Oct 2020 16:22) (77 - 86)  RR: 17 (14 Oct 2020 05:47) (12 - 20)  SpO2: 98% (14 Oct 2020 05:47) (98% - 100%)    ERCP results as per GI:   BILE DUCT: Small cbd ~ 2.5 mm  .Duct sweep x 2 . No stones seen . excellent drainage. Intrahepatics thin. Sphincterotomy done    Cystic DUCT remnant ok    Physical Exam:    Gen: AAOX3,    HEENT:   Chest: RRR  Abd:  Pelvic:  Ext:       MEDICATIONS  (STANDING):  budesonide  80 MICROgram(s)/formoterol 4.5 MICROgram(s) Inhaler 2 Puff(s) Inhalation two times a day  sodium chloride 0.9% with potassium chloride 20 mEq/L 1000 milliLiter(s) (75 mL/Hr) IV Continuous <Continuous>    MEDICATIONS  (PRN):  acetaminophen 300 mG/codeine 30 mG 1 Tablet(s) Oral every 4 hours PRN Moderate Pain (4 - 6)      Labs:                          13.9   7.81  )-----------( 187      ( 13 Oct 2020 06:31 )             40.8     10-13    140  |  110<H>  |  8   ----------------------------<  89  4.3   |  25  |  1.01    Ca    9.2      13 Oct 2020 06:31    TPro  6.5  /  Alb  3.5  /  TBili  0.9  /  DBili  x   /  AST  17  /  ALT  46  /  AlkPhos  79  10-13        I&O's Detail    12 Oct 2020 07:01  -  13 Oct 2020 07:00  --------------------------------------------------------  IN:    IV PiggyBack: 750 mL  Total IN: 750 mL    OUT:  Total OUT: 0 mL    Total NET: 750 mL      13 Oct 2020 07:01  -  14 Oct 2020 06:37  --------------------------------------------------------  IN:  Total IN: 0 mL    OUT:    Voided (mL): 1700 mL  Total OUT: 1700 mL    Total NET: -1700 mL          Radiology:   Subjective and Objective     24 y/o male is POD#2 s/p laparoscopic cholecystectomy with intra-operative cholangiogram, underwent ERCP with sphincterotomy 10/13/20. Pt examined at bedside, pain is improving (4/10), no other complaints. Tolerating a regular diet, no N/V.       Interval HPI/Overnight events: No overnight events as per nurse      Vital Signs Last 24 Hrs  T(C): 37.2 (14 Oct 2020 05:47), Max: 37.2 (14 Oct 2020 05:47)  T(F): 98.9 (14 Oct 2020 05:47), Max: 98.9 (14 Oct 2020 05:47)  HR: 68 (14 Oct 2020 05:47) (64 - 89)  BP: 100/56 (14 Oct 2020 05:47) (100/56 - 120/76)  BP(mean): 77 (13 Oct 2020 16:22) (77 - 86)  RR: 17 (14 Oct 2020 05:47) (12 - 20)  SpO2: 98% (14 Oct 2020 05:47) (98% - 100%)    ERCP results as per GI:   BILE DUCT: Small cbd ~ 2.5 mm  .Duct sweep x 2 . No stones seen . excellent drainage. Intrahepatics thin. Sphincterotomy done  Cystic DUCT remnant ok    Physical Exam:  Gen: AAOX3,  no acute distress  Skin: No jaundice, no icterus  Abd: soft, nondistended, no rebound tenderness or TTP, no guarding; wound sites are c/d/i  Ext: no edema, no calf pain b/l       MEDICATIONS  (STANDING):  budesonide  80 MICROgram(s)/formoterol 4.5 MICROgram(s) Inhaler 2 Puff(s) Inhalation two times a day  sodium chloride 0.9% with potassium chloride 20 mEq/L 1000 milliLiter(s) (75 mL/Hr) IV Continuous <Continuous>    MEDICATIONS  (PRN):  acetaminophen 300 mG/codeine 30 mG 1 Tablet(s) Oral every 4 hours PRN Moderate Pain (4 - 6)      Labs:                          13.9   7.81  )-----------( 187      ( 13 Oct 2020 06:31 )             40.8     10-13    140  |  110<H>  |  8   ----------------------------<  89  4.3   |  25  |  1.01    Ca    9.2      13 Oct 2020 06:31    TPro  6.5  /  Alb  3.5  /  TBili  0.9  /  DBili  x   /  AST  17  /  ALT  46  /  AlkPhos  79  10-13        I&O's Detail    12 Oct 2020 07:01  -  13 Oct 2020 07:00  --------------------------------------------------------  IN:    IV PiggyBack: 750 mL  Total IN: 750 mL    OUT:  Total OUT: 0 mL    Total NET: 750 mL      13 Oct 2020 07:01  -  14 Oct 2020 06:37  --------------------------------------------------------  IN:  Total IN: 0 mL    OUT:    Voided (mL): 1700 mL  Total OUT: 1700 mL    Total NET: -1700 mL          Radiology:

## 2020-10-14 NOTE — PROGRESS NOTE ADULT - ASSESSMENT
24 y/o male s/p laparoscopic cholecystectomy with + IOC  POD # 1    1. NPO   2. ERCP   3. prn pain control    4. IVF hydration
25 M with PMHx of Asthma (no hx of hospitalization or intubation) p/w pain in right upper abdomen radiating to back associated with nausea and vomiting. S/P Lap Dottie today with IOC showing possible stone in duct.  S/p normal ERCP yesterday. Patient reports feeling well. Tolerating diet. VSS    
D/C home today.
s/p lap ileana  cbd stones on ioc  post op stable    npo after midnight  plan for ercp in am  
26 y/o male is POD#2 s/p laparoscopic cholecystectomy with intra-operative cholangiogram, underwent ERCP with sphincterotomy 10/13/20.     1. Regular diet  2. AM Labs  3. PRN pain control  4. Discharge planning for today  5. Follow up with GI in one year as per Dr. Dove's instructions
Patient is a 25y old  Male with PMHx of asthma POD#1 s/p laparoscopic cholecystectomy with IOC showing stones in the CBD. Pt endorses moderate RUQ pain (6/10) with movement that is being controlled with pain medication. Pt tolerated clear liquids yesterday, has been ambulatory and voiding. Pt is negative for N/V and has been NPO since midnight.     Plan:   1. Continue NPO, patient scheduled to have ERCP this AM  2. Continue pain mgmt PRN  3. Monitor surgical incision sites

## 2020-10-15 LAB — SURGICAL PATHOLOGY STUDY: SIGNIFICANT CHANGE UP

## 2020-10-20 PROBLEM — K81.9 CHOLECYSTITIS: Status: ACTIVE | Noted: 2020-09-14

## 2020-10-26 ENCOUNTER — APPOINTMENT (OUTPATIENT)
Dept: SURGERY | Facility: CLINIC | Age: 25
End: 2020-10-26
Payer: COMMERCIAL

## 2020-10-26 DIAGNOSIS — K81.9 CHOLECYSTITIS, UNSPECIFIED: ICD-10-CM

## 2020-10-26 PROCEDURE — 99024 POSTOP FOLLOW-UP VISIT: CPT

## 2020-10-26 NOTE — HISTORY OF PRESENT ILLNESS
[de-identified] : Mr. CANALES  is s/p laparoscopic  cholecystectomy   on 10/12/2020. Today Mr. CANALES offers no complaints. patient reports no fever, chills,  or  pain. His  surgical wounds are  healing well. No signs of inflammation, infection or exudate. Patient reports good bowel movements and appetite.

## 2020-10-26 NOTE — DATA REVIEWED
[FreeTextEntry1] : PHU CANALES                    2\par \par \par \par Surgical Final Report\par \par \par \par \par Final Diagnosis\par Gallbladder, cholecystectomy: Chronic calculous cholecystitis and\par cholesterolosis\par \par Verified by: Nanette Ji M.D.\par (Electronic Signature)\par Reported on: 10/15/20 13:59 EDT, [facility],\par [address], [place] [address]\par Phone: [phone]   Fax: [phone]\par _________________________________________________________________\par \par Clinical History\par Cholecystitis\par Surgical procedure: Laparoscopic cholecystectomy with IOC\par \par Specimen(s) Submitted\par 1     Gallbladder

## 2020-10-26 NOTE — ASSESSMENT
[FreeTextEntry1] : Mr. CANALES is doing well, with excellent post-operative recovery. All surgical incisions are healing well and as expected. There is no evidence of infection or complication, and he is progressing as expected. Post-operative wound care, activity, restrictions and precautions reinforced. Patient instructed to refrain from any heavy lifting greater than 10-15 pounds for at least 4 weeks post-operatively. pathology results were discussed in details.  Patient's questions and concerns addressed to patient's satisfaction.

## 2020-10-26 NOTE — PLAN
[FreeTextEntry1] : Mr. CANALES will follow up  if needed. Warning signs, follow up, and restrictions were discussed with the patient.

## 2023-08-04 ENCOUNTER — APPOINTMENT (OUTPATIENT)
Dept: GASTROENTEROLOGY | Facility: CLINIC | Age: 28
End: 2023-08-04
Payer: COMMERCIAL

## 2023-08-04 VITALS
BODY MASS INDEX: 26.83 KG/M2 | DIASTOLIC BLOOD PRESSURE: 76 MMHG | WEIGHT: 177 LBS | TEMPERATURE: 97.6 F | HEIGHT: 68 IN | SYSTOLIC BLOOD PRESSURE: 108 MMHG | HEART RATE: 82 BPM | OXYGEN SATURATION: 98 %

## 2023-08-04 DIAGNOSIS — R19.7 DIARRHEA, UNSPECIFIED: ICD-10-CM

## 2023-08-04 DIAGNOSIS — R10.13 EPIGASTRIC PAIN: ICD-10-CM

## 2023-08-04 DIAGNOSIS — K76.0 FATTY (CHANGE OF) LIVER, NOT ELSEWHERE CLASSIFIED: ICD-10-CM

## 2023-08-04 DIAGNOSIS — K21.9 GASTRO-ESOPHAGEAL REFLUX DISEASE W/OUT ESOPHAGITIS: ICD-10-CM

## 2023-08-04 PROCEDURE — 99204 OFFICE O/P NEW MOD 45 MIN: CPT

## 2023-08-04 RX ORDER — SIMETHICONE 125 MG/1
125 TABLET, CHEWABLE ORAL
Qty: 120 | Refills: 3 | Status: ACTIVE | COMMUNITY
Start: 2023-08-04 | End: 1900-01-01

## 2023-08-04 RX ORDER — CHOLESTYRAMINE 4 G/9G
4 POWDER, FOR SUSPENSION ORAL
Qty: 90 | Refills: 3 | Status: ACTIVE | COMMUNITY
Start: 2023-08-04 | End: 1900-01-01

## 2023-08-04 RX ORDER — PANTOPRAZOLE 40 MG/1
40 TABLET, DELAYED RELEASE ORAL
Qty: 90 | Refills: 2 | Status: ACTIVE | COMMUNITY
Start: 2023-08-04 | End: 1900-01-01

## 2023-08-04 NOTE — ASSESSMENT
[FreeTextEntry1] : Dyspepsia: The patient complains of dyspeptic symptoms.  The patient was advised to abide by an anti-gas (low FOD-MAP) diet.  The patient was given a pamphlet for anti-gas (low FOD-MAP).  The patient and I reviewed the anti-gas (low FOD-MAP) diet at length. The patient is to start on a trial of Simethicone one tablet 4 times a day p.r.n. abdominal pain and gas. GERD: The patient was advised to avoid late-night meals and dietary indiscretions.  The patient was advised to avoid fried and fatty foods.  The patient was advised to abide by an anti-GERD diet. The patient was given a pamphlet for anti-GERD.  The patient and I reviewed the anti-GERD diet at length. I recommend a trial of Pantoprazole 40 mg once a day x 3 months for the symptoms. Diarrhea: The patient complains of diarrhea.  I recommend a low residue diet. The patient is to avoid fiber supplementation. The patient is to consider starting a trial of a probiotic such as Align once a day.    If the symptoms persist, the patient may require sending stool studies for C+S, O+P x3, and C. difficile to assess for an infectious etiology of the diarrhea.  The symptoms are worse after meals.  The patient has a history of cholecystectomy.  I recommend a trial of cholestyramine one packet once a day for possible bile induced diarrhea. If the symptoms persist, the patient may require a colonoscopy to assess for colitis versus other causes.  The patient was told of the risks and benefits of the procedure.  The patient was told of the risks of perforation, emergency surgery, bleeding, infections and missed lesions.  The patient agreed and will follow-up to reassess the symptoms.   Family History of GI Malignancy: The patient admits to a family history of GI problems.  The patient's mother had a history of gallbladder surgery and father  of pancreatic cancer. Follow-up: The patient is to follow-up in the office in 4 weeks to reassess the symptoms. The patient was told to call the office if any further problems.

## 2023-08-04 NOTE — PHYSICAL EXAM
[Well Developed] : well developed [Normal] : normal bowel sounds, non-tender, no masses, soft, no no hepato-splenomegaly [Bowel Sounds] : normal bowel sounds [Abdomen Tenderness] : non-tender [No Masses] : no abdominal mass palpated [No CVA Tenderness] : no CVA  tenderness [Abnormal Walk] : normal gait [Motor Tone] : muscle strength and tone were normal [Normal Color / Pigmentation] : normal skin color and pigmentation [] : no rash [Sensation] : the sensory exam was normal to light touch and pinprick [Motor Exam] : the motor exam was normal [de-identified] : overweight [de-identified] : FROM in all extremities [de-identified] : not done [de-identified] : anxious slightly depressed

## 2023-08-04 NOTE — REVIEW OF SYSTEMS
[Feeling Tired] : feeling tired [Shortness Of Breath] : shortness of breath [Wheezing] : wheezing [SOB on Exertion] : shortness of breath during exertion [Diarrhea] : diarrhea [Heartburn] : heartburn [Bloating (gassiness)] : bloating [Anxiety] : anxiety [Depression] : depression [Negative] : Heme/Lymph [FreeTextEntry3] : blurred vision

## 2023-08-04 NOTE — HISTORY OF PRESENT ILLNESS
[de-identified] : The CAT scan of the abdomen and pelvis with IV contrast performed on July 29, 2020 revealed no hydroureteronephrosis or radiopaque urinary tract stones. No appendicitis no radiopaque gallstones. Bowel caliber change in the midline abdomen with mild upstream small bowel dilatation/fecalization and distal collapse which may be due to peristalsis with incomplete passage of the ingested contents. Bowel obstruction is not entirely excluded. [FreeTextEntry1] : The MRI of the abdomen and pelvis with IV contrast and MRCP performed on August 28, 2020 revealed hepatic steatosis, cholelithiasis and no evidence of choledocholithiasis.  The abdominal ultrasound performed on August 14, 2020 revealed mild hepatosplenomegaly with diffuse fatty infiltration of the liver. Also noted was cholelithiasis without evidence for cholecystitis. There was limited visualization of the pancreas due to bowel gas.

## 2023-11-06 ENCOUNTER — APPOINTMENT (OUTPATIENT)
Dept: GASTROENTEROLOGY | Facility: CLINIC | Age: 28
End: 2023-11-06

## 2024-02-07 NOTE — ASU PATIENT PROFILE, ADULT - BILL OF RIGHTS/ADMISSION INFORMATION PROVIDED TO:
Patient c/o chest pain and SOB x 2 weeks. Sent in for infectious workup and echo. Denies fever, cough, palpitations, nausea, vomiting, dizziness, headache. Phx HTN, DM2 Patient

## 2025-04-07 NOTE — PATIENT PROFILE ADULT - DO YOU FEEL LIKE HURTING YOURSELF OR OTHERS?
Anesthesia Post Evaluation    Patient: Luciana Thurston    Procedure(s) Performed: Procedure(s) (LRB):  EGD (ESOPHAGOGASTRODUODENOSCOPY) (N/A)    Final Anesthesia Type: general      Patient location during evaluation: PACU  Patient participation: Yes- Able to Participate  Level of consciousness: awake and alert  Post-procedure vital signs: reviewed and stable  Pain management: adequate  Airway patency: patent    PONV status at discharge: No PONV  Anesthetic complications: no      Cardiovascular status: blood pressure returned to baseline  Respiratory status: unassisted, room air and spontaneous ventilation  Hydration status: euvolemic  Follow-up not needed.              Vitals Value Taken Time   /58 04/04/25 16:45   Temp 36.7 °C (98 °F) 04/04/25 16:15   Pulse 60 04/04/25 16:45   Resp 16 04/04/25 16:45   SpO2 100 % 04/04/25 16:45         No case tracking events are documented in the log.      Pain/Cecil Score: No data recorded         no

## 2025-07-31 ENCOUNTER — NON-APPOINTMENT (OUTPATIENT)
Age: 30
End: 2025-07-31